# Patient Record
Sex: FEMALE
[De-identification: names, ages, dates, MRNs, and addresses within clinical notes are randomized per-mention and may not be internally consistent; named-entity substitution may affect disease eponyms.]

---

## 2018-08-03 ENCOUNTER — HOSPITAL ENCOUNTER (INPATIENT)
Dept: HOSPITAL 88 - FSED | Age: 63
LOS: 5 days | Discharge: HOME | DRG: 387 | End: 2018-08-08
Attending: INTERNAL MEDICINE | Admitting: INTERNAL MEDICINE
Payer: COMMERCIAL

## 2018-08-03 VITALS — DIASTOLIC BLOOD PRESSURE: 61 MMHG | SYSTOLIC BLOOD PRESSURE: 151 MMHG

## 2018-08-03 VITALS — WEIGHT: 157.56 LBS | BODY MASS INDEX: 27.92 KG/M2 | HEIGHT: 63 IN

## 2018-08-03 VITALS — DIASTOLIC BLOOD PRESSURE: 79 MMHG | SYSTOLIC BLOOD PRESSURE: 162 MMHG

## 2018-08-03 VITALS — SYSTOLIC BLOOD PRESSURE: 151 MMHG | DIASTOLIC BLOOD PRESSURE: 61 MMHG

## 2018-08-03 DIAGNOSIS — E87.6: ICD-10-CM

## 2018-08-03 DIAGNOSIS — K29.40: ICD-10-CM

## 2018-08-03 DIAGNOSIS — R20.0: ICD-10-CM

## 2018-08-03 DIAGNOSIS — R19.7: ICD-10-CM

## 2018-08-03 DIAGNOSIS — R79.89: ICD-10-CM

## 2018-08-03 DIAGNOSIS — R01.1: ICD-10-CM

## 2018-08-03 DIAGNOSIS — F17.210: ICD-10-CM

## 2018-08-03 DIAGNOSIS — K50.911: Primary | ICD-10-CM

## 2018-08-03 DIAGNOSIS — E66.3: ICD-10-CM

## 2018-08-03 DIAGNOSIS — K25.9: ICD-10-CM

## 2018-08-03 DIAGNOSIS — E78.5: ICD-10-CM

## 2018-08-03 DIAGNOSIS — K20.9: ICD-10-CM

## 2018-08-03 DIAGNOSIS — E86.0: ICD-10-CM

## 2018-08-03 LAB
ALBUMIN SERPL-MCNC: 3 G/DL (ref 3.5–5)
ALP SERPL-CCNC: 50 IU/L (ref 40–150)
ALT SERPL-CCNC: 33 IU/L (ref 0–55)
ANION GAP SERPL CALC-SCNC: 14.4 MMOL/L (ref 8–16)
BASOPHILS # BLD AUTO: 0.1 10*3/UL (ref 0–0.1)
BASOPHILS NFR BLD AUTO: 0.5 % (ref 0–1)
BILIRUB CONJ SERPL-MCNC: 0.5 MG/DL (ref 0–0.5)
BUN SERPL-MCNC: 14 MG/DL (ref 7–26)
BUN/CREAT SERPL: 17 (ref 6–25)
CALCIUM SERPL-MCNC: 9.4 MG/DL (ref 8.4–10.2)
CHLORIDE SERPL-SCNC: 98 MMOL/L (ref 98–107)
CK MB SERPL-MCNC: 5 NG/ML (ref 0–5)
CK SERPL-CCNC: 66 IU/L (ref 29–168)
CO2 SERPL-SCNC: 23 MMOL/L (ref 22–29)
DEPRECATED NEUTROPHILS # BLD AUTO: 12.3 10*3/UL (ref 2.1–6.9)
EGFRCR SERPLBLD CKD-EPI 2021: > 60 ML/MIN (ref 60–?)
EOSINOPHIL # BLD AUTO: 0.1 10*3/UL (ref 0–0.4)
EOSINOPHIL NFR BLD AUTO: 0.4 % (ref 0–6)
ERYTHROCYTE [DISTWIDTH] IN CORD BLOOD: 15.6 % (ref 11.7–14.4)
GLUCOSE SERPLBLD-MCNC: 56 MG/DL (ref 74–118)
HCT VFR BLD AUTO: 34.7 % (ref 34.2–44.1)
HGB BLD-MCNC: 12 G/DL (ref 12–16)
LYMPHOCYTES # BLD: 2.4 10*3/UL (ref 1–3.2)
LYMPHOCYTES NFR BLD AUTO: 15.1 % (ref 18–39.1)
MCH RBC QN AUTO: 27.5 PG (ref 28–32)
MCHC RBC AUTO-ENTMCNC: 34.6 G/DL (ref 31–35)
MCV RBC AUTO: 79.4 FL (ref 81–99)
MONOCYTES # BLD AUTO: 1 10*3/UL (ref 0.2–0.8)
MONOCYTES NFR BLD AUTO: 6.5 % (ref 4.4–11.3)
NEUTS SEG NFR BLD AUTO: 77.1 % (ref 38.7–80)
PLATELET # BLD AUTO: 418 X10E3/UL (ref 140–360)
POTASSIUM SERPL-SCNC: 3.4 MMOL/L (ref 3.5–5.1)
RBC # BLD AUTO: 4.37 X10E6/UL (ref 3.6–5.1)
SODIUM SERPL-SCNC: 132 MMOL/L (ref 136–145)

## 2018-08-03 PROCEDURE — 80076 HEPATIC FUNCTION PANEL: CPT

## 2018-08-03 PROCEDURE — 83540 ASSAY OF IRON: CPT

## 2018-08-03 PROCEDURE — 83516 IMMUNOASSAY NONANTIBODY: CPT

## 2018-08-03 PROCEDURE — 74177 CT ABD & PELVIS W/CONTRAST: CPT

## 2018-08-03 PROCEDURE — 82607 VITAMIN B-12: CPT

## 2018-08-03 PROCEDURE — 87177 OVA AND PARASITES SMEARS: CPT

## 2018-08-03 PROCEDURE — 82948 REAGENT STRIP/BLOOD GLUCOSE: CPT

## 2018-08-03 PROCEDURE — 84466 ASSAY OF TRANSFERRIN: CPT

## 2018-08-03 PROCEDURE — 93005 ELECTROCARDIOGRAM TRACING: CPT

## 2018-08-03 PROCEDURE — 71045 X-RAY EXAM CHEST 1 VIEW: CPT

## 2018-08-03 PROCEDURE — 84132 ASSAY OF SERUM POTASSIUM: CPT

## 2018-08-03 PROCEDURE — 88312 SPECIAL STAINS GROUP 1: CPT

## 2018-08-03 PROCEDURE — 83993 ASSAY FOR CALPROTECTIN FECAL: CPT

## 2018-08-03 PROCEDURE — 36415 COLL VENOUS BLD VENIPUNCTURE: CPT

## 2018-08-03 PROCEDURE — 85014 HEMATOCRIT: CPT

## 2018-08-03 PROCEDURE — 85651 RBC SED RATE NONAUTOMATED: CPT

## 2018-08-03 PROCEDURE — 83036 HEMOGLOBIN GLYCOSYLATED A1C: CPT

## 2018-08-03 PROCEDURE — 99284 EMERGENCY DEPT VISIT MOD MDM: CPT

## 2018-08-03 PROCEDURE — 93306 TTE W/DOPPLER COMPLETE: CPT

## 2018-08-03 PROCEDURE — 82550 ASSAY OF CK (CPK): CPT

## 2018-08-03 PROCEDURE — 86671 FUNGUS NES ANTIBODY: CPT

## 2018-08-03 PROCEDURE — 80053 COMPREHEN METABOLIC PANEL: CPT

## 2018-08-03 PROCEDURE — 85018 HEMOGLOBIN: CPT

## 2018-08-03 PROCEDURE — 84443 ASSAY THYROID STIM HORMONE: CPT

## 2018-08-03 PROCEDURE — 71046 X-RAY EXAM CHEST 2 VIEWS: CPT

## 2018-08-03 PROCEDURE — 88305 TISSUE EXAM BY PATHOLOGIST: CPT

## 2018-08-03 PROCEDURE — 87493 C DIFF AMPLIFIED PROBE: CPT

## 2018-08-03 PROCEDURE — 82784 ASSAY IGA/IGD/IGG/IGM EACH: CPT

## 2018-08-03 PROCEDURE — 84484 ASSAY OF TROPONIN QUANT: CPT

## 2018-08-03 PROCEDURE — 80048 BASIC METABOLIC PNL TOTAL CA: CPT

## 2018-08-03 PROCEDURE — 80061 LIPID PANEL: CPT

## 2018-08-03 PROCEDURE — 81001 URINALYSIS AUTO W/SCOPE: CPT

## 2018-08-03 PROCEDURE — 86039 ANTINUCLEAR ANTIBODIES (ANA): CPT

## 2018-08-03 PROCEDURE — 83630 LACTOFERRIN FECAL (QUAL): CPT

## 2018-08-03 PROCEDURE — 85025 COMPLETE CBC W/AUTO DIFF WBC: CPT

## 2018-08-03 PROCEDURE — 82270 OCCULT BLOOD FECES: CPT

## 2018-08-03 PROCEDURE — 86140 C-REACTIVE PROTEIN: CPT

## 2018-08-03 PROCEDURE — 43239 EGD BIOPSY SINGLE/MULTIPLE: CPT

## 2018-08-03 PROCEDURE — 82728 ASSAY OF FERRITIN: CPT

## 2018-08-03 PROCEDURE — 86256 FLUORESCENT ANTIBODY TITER: CPT

## 2018-08-03 PROCEDURE — 86677 HELICOBACTER PYLORI ANTIBODY: CPT

## 2018-08-03 PROCEDURE — 82553 CREATINE MB FRACTION: CPT

## 2018-08-03 PROCEDURE — 87045 FECES CULTURE AEROBIC BACT: CPT

## 2018-08-03 PROCEDURE — 81003 URINALYSIS AUTO W/O SCOPE: CPT

## 2018-08-03 RX ADMIN — Medication SCH MG: at 14:13

## 2018-08-03 RX ADMIN — COLESTIPOL HYDROCHLORIDE SCH G: 1 TABLET ORAL at 20:54

## 2018-08-03 RX ADMIN — SODIUM CHLORIDE SCH MLS/HR: 9 INJECTION, SOLUTION INTRAVENOUS at 20:54

## 2018-08-03 RX ADMIN — ATENOLOL SCH MG: 50 TABLET ORAL at 20:55

## 2018-08-04 VITALS — SYSTOLIC BLOOD PRESSURE: 150 MMHG | DIASTOLIC BLOOD PRESSURE: 67 MMHG

## 2018-08-04 VITALS — SYSTOLIC BLOOD PRESSURE: 139 MMHG | DIASTOLIC BLOOD PRESSURE: 61 MMHG

## 2018-08-04 VITALS — DIASTOLIC BLOOD PRESSURE: 59 MMHG | SYSTOLIC BLOOD PRESSURE: 139 MMHG

## 2018-08-04 VITALS — DIASTOLIC BLOOD PRESSURE: 62 MMHG | SYSTOLIC BLOOD PRESSURE: 147 MMHG

## 2018-08-04 VITALS — SYSTOLIC BLOOD PRESSURE: 154 MMHG | DIASTOLIC BLOOD PRESSURE: 63 MMHG

## 2018-08-04 VITALS — SYSTOLIC BLOOD PRESSURE: 147 MMHG | DIASTOLIC BLOOD PRESSURE: 62 MMHG

## 2018-08-04 LAB
CHOLEST SERPL-MCNC: 129 MD/DL (ref 0–199)
CHOLEST/HDLC SERPL: 3.3 {RATIO} (ref 3–3.6)
CK MB SERPL-MCNC: 3.3 NG/ML (ref 0–5)
CK MB SERPL-MCNC: 3.6 NG/ML (ref 0–5)
CK SERPL-CCNC: 59 IU/L (ref 29–168)
CK SERPL-CCNC: 62 IU/L (ref 29–168)
HDLC SERPL-MSCNC: 39 MG/DL (ref 40–60)
LDLC SERPL CALC-MCNC: 66 MG/DL (ref 60–130)
TRIGL SERPL-MCNC: 118 MG/DL (ref 0–149)
TSH SERPL DL<=0.005 MIU/L-ACNC: 0.85 UIU/ML (ref 0.35–4.94)

## 2018-08-04 RX ADMIN — ATENOLOL SCH MG: 50 TABLET ORAL at 21:12

## 2018-08-04 RX ADMIN — SODIUM CHLORIDE SCH MLS/HR: 9 INJECTION, SOLUTION INTRAVENOUS at 21:20

## 2018-08-04 RX ADMIN — Medication SCH MG: at 21:12

## 2018-08-04 RX ADMIN — LOSARTAN POTASSIUM SCH MG: 100 TABLET, FILM COATED ORAL at 09:30

## 2018-08-04 RX ADMIN — COLESTIPOL HYDROCHLORIDE SCH G: 1 TABLET ORAL at 09:30

## 2018-08-04 RX ADMIN — BICTEGRAVIR SODIUM, EMTRICITABINE, AND TENOFOVIR ALAFENAMIDE FUMARATE SCH: 50; 200; 25 TABLET ORAL at 09:00

## 2018-08-04 RX ADMIN — Medication SCH MG: at 09:30

## 2018-08-04 RX ADMIN — FAMOTIDINE SCH MG: 20 TABLET, FILM COATED ORAL at 17:26

## 2018-08-04 RX ADMIN — Medication SCH MG: at 15:39

## 2018-08-04 RX ADMIN — COLESTIPOL HYDROCHLORIDE SCH G: 1 TABLET ORAL at 21:11

## 2018-08-04 NOTE — HISTORY AND PHYSICAL
PRIMARY CARE PHYSICIAN:  Dr. Sharpe at [facility].



CHIEF COMPLAINT:  Dizziness and racing heart.



HISTORY OF PRESENT ILLNESS:  This is a 63-year-old woman with a history of 

Crohn's disease and chronic diarrhea, now developing dizziness and racing 

heart with left arm tingling, prompting her to come to the hospital. Here 

she was found to have elevated troponin. She is admitted for further 

evaluation and management. 



PAST MEDICAL HISTORY:  Hypertension, diabetes mellitus, hyperlipidemia, 

heart murmur, cigarette use and Crohn's disease.



PAST SURGICAL HISTORY:  None.



ALLERGIES:  PER THE ELECTRONIC MEDICAL RECORDS.



FAMILY HISTORY/SOCIAL HISTORY:  Patient is single. No alcohol or illicits. 

She was smoking 1 pack of cigarettes per day, now down to half a pack per 

day.



MEDICATIONS:  Per the electronic medical records. Medications reviewed.



REVIEW OF SYSTEMS:  Denies any dizziness, fever, chills, sweats, nausea, 

vomiting, diarrhea, leg pain, back pain, headache, blurred vision.



VITAL SIGNS:  Have been reviewed.



PHYSICAL EXAMINATION

GENERAL APPEARANCE:  A tired-appearing woman resting in bed.

HEENT:  Anicteric. Pupils responsive to light. No oral lesions. 

CARDIOVASCULAR:  Normal S1/S2. She has a 3/6 systolic murmur.

LUNGS:  Moderate breath sounds.

ABDOMEN:  Soft, nontender, nondistended. 

EXTREMITIES:  No edema or calf tenderness. 

NEUROLOGICALLY:  Alert and oriented x3. Moving all extremities. 



LABS:  Reviewed.



ASSESSMENT:  She is a 63-year-old woman.

1. Elevated troponin.

1. Arm numbness.

2. Heart murmur.

3. Abnormal electrocardiogram with T-wave flattening and inversion in 

the precordial leads.

4. Dehydration.

5. Diarrhea.

6. Overweight state.

7. Diabetes mellitus type 2.

8. Hypertension.

9. Hyperlipidemia.

10. Crohn's disease.



PLAN

1. Obtain 2-D echocardiogram.

2. Cardiology consultation.

3. Obtain TSH for tachycardia.

4. Obtain hemoglobin A1c and lipid panel.

5. Once cleared by Cardiology, patient can follow up with her GI doctor 

at [facility] for continuing management of her Crohn's disease.

6. Start prophylaxis with Pepcid and Lovenox.



7. Follow up cardiology recommendations.









DD:  08/04/2018 14:41

DT:  08/04/2018 15:40

Job#:  [id number]

## 2018-08-04 NOTE — CONSULTATION
DATE OF CONSULTATION:  2018 





CARDIAC CONSULTATION



REASON FOR THE CONSULTATION:  Chest pressure, chest tightness, left arm 

pain, elevated troponin.



HISTORY:  This is a 63-year-old lady, hypertensive, diabetic, 

hypercholesterolemic, smoker.  Patient for the last 2 to 3 months is having 

diarrhea.  She had colonoscopy and workup and she was diagnosed with 

inflammatory bowel disease, Crohn or may be ulcerative colitis.  She came 

today because she felt her heart racing and with that she had chest 

tightness and left arm pain.  Initially, her troponin was elevated at 0.77, 

second troponin 0.456, third troponin of 0.394.  The patient is admitted 

for further management.  Her EKG showed nonspecific ST changes laterally 

and LVH.  Currently, she is doing well.  Patient does have easy 

fatigability, shortness of breath on exertion.  She attributed all her 

symptoms to her GI problem __________ for the last 2 to 3 months.  She had 

colonoscopy and workup.  There is no orthopnea, no paroxysmal nocturnal 

dyspnea, no syncope, no presyncope, no prior tachycardia or any other 

health problem, no recent trouble, no signs of deep venous thrombosis, and 

no hormone use.



REVIEW OF SYSTEMS:  Extensive to all systems, only pertinent positive and 

negative ones will be mentioned.

CARDIAC:  As per above.

PULMONARY:  No cough, no hemoptysis.

GI:  Chronic diarrhea.  No hematemesis, no melena, but chronic diarrhea.

:  No hematuria, no dysuria.

NEUROMUSCULAR:  No aches, no pains.

GENERAL:  No fever, no chills.

ENDOCRINE:  Patient is diabetic.



SOCIAL HISTORY:  She is single.  Unfortunately she smokes a pack a day.  

She is .



HOME MEDICATIONS:  Includes:

1. Amlodipine 5 mg a day.

2. Lovastatin 20 mg a day.

3. Colestipol 1 g 5 per day, 2 in the morning, 3 in the afternoon.

4. Losartan/hydrochlorothiazide 100 and 25 one tablet a day.

5. Atenolol 25 mg a day.

6. Metformin 1000 mg twice a day.

7. Glimepiride 1 mg a day.

8. Lisinopril 40 mg twice a day.



ALLERGIES:  ATORVASTATIN, PATIENT IS INTOLERANT TO ATORVASTATIN.



PAST MEDICAL HISTORY:

1. Hypertension.

2. Diabetes mellitus.  Of note, her diabetes is at least for 15 years.

3. Hyperlipidemia.

4. Smoker.

5. Diarrhea with diagnosis of inflammatory bowel disease, possible Crohn, 

possible ulcerative colitis.



FAMILY HISTORY:  Father was alcoholic,  of alcohol complication at age 

65.  Mother  in her 70s with kidney disease.  She had diabetes mellitus 

for short period.  Four brothers, all are diabetic, 2 of them already had 

PCI procedure in their 60s.  Five sisters.  No children.



PHYSICAL EXAMINATION:

VITAL SIGNS:  Height of 5 feet 3 inches, weight of 154 pounds, blood 

pressure 150/70, heart rate of 70, respiratory rate of 18.

HEENT:  Pupils are equal and reactive.

NECK:  No elevation of jugular venous pulsation.  No bruit.

CHEST:  Clear to auscultation and percussion.

HEART:  PMI, fifth left intercostal space.  Normal first and second heart 

sounds.

ABDOMEN:  Soft with no organomegaly.  No abdominal bruits.

EXTREMITIES:  No cyanosis.  No clubbing.  No edema. 

NEUROLOGIC:  Nonfocal.



LAB DATA:  Sodium of 132, potassium 3.4, BUN of 14, creatinine of 0.8.  

White blood cell count of 15.9, hemoglobin of 12, hematocrit 35%, platelet 

count of 418,000.  TSH of 0.85.  Triglycerides of 118, cholesterol of 129, 

HDL of 39, LDL of 66.  CK, CK-MB are normal.  Troponin 0.77, 0.456, 0.394.  

EKG as described above.



IMPRESSION:

1. Troponin leak in setting of palpitation, most likely probably 

supraventricular tachycardia secondary to hypokalemia and with that patient 

had troponin leak, which will indicate severe triple-vessel coronary artery 

disease in a patient who is diabetic for many years, heavy smoker for many 

years, and is hypertensive, hyperlipidemic.

2. Severe hypertension.

3. Diabetes mellitus.

4. Hypertension.

5. Hypercholesterolemia.



PLAN:  Patient's stool will be checked for blood.  Patient will be loaded 

with Plavix.  Patient already most likely failed her stress test because of 

the leak of troponin in the setting of short-lived probably arrhythmia with 

symptomatic pain in the arm and in the chest.  Patient options are 

discussed.  Patient to make a decision tomorrow.  Definitely, will load her 

with Plavix.  Will continue antiplatelet agent.  Will continue diabetes 

medication.  Will continue beta blocker.  Will continue ARB.  Patient on 

appropriate medical therapy, planned to be __________ address her problem.  

Questions are answered.





DD:  2018 16:05

DT:  2018 20:01

Job#:  [id number]

## 2018-08-05 VITALS — SYSTOLIC BLOOD PRESSURE: 120 MMHG | DIASTOLIC BLOOD PRESSURE: 52 MMHG

## 2018-08-05 VITALS — SYSTOLIC BLOOD PRESSURE: 111 MMHG | DIASTOLIC BLOOD PRESSURE: 52 MMHG

## 2018-08-05 VITALS — DIASTOLIC BLOOD PRESSURE: 61 MMHG | SYSTOLIC BLOOD PRESSURE: 140 MMHG

## 2018-08-05 VITALS — DIASTOLIC BLOOD PRESSURE: 47 MMHG | SYSTOLIC BLOOD PRESSURE: 115 MMHG

## 2018-08-05 VITALS — DIASTOLIC BLOOD PRESSURE: 65 MMHG | SYSTOLIC BLOOD PRESSURE: 127 MMHG

## 2018-08-05 VITALS — SYSTOLIC BLOOD PRESSURE: 125 MMHG | DIASTOLIC BLOOD PRESSURE: 48 MMHG

## 2018-08-05 VITALS — SYSTOLIC BLOOD PRESSURE: 127 MMHG | DIASTOLIC BLOOD PRESSURE: 65 MMHG

## 2018-08-05 LAB
ALBUMIN SERPL-MCNC: 2.4 G/DL (ref 3.5–5)
ALBUMIN/GLOB SERPL: 0.7 {RATIO} (ref 0.8–2)
ALP SERPL-CCNC: 46 IU/L (ref 40–150)
ALT SERPL-CCNC: 22 IU/L (ref 0–55)
ANION GAP SERPL CALC-SCNC: 13 MMOL/L (ref 8–16)
BASOPHILS # BLD AUTO: 0.1 10*3/UL (ref 0–0.1)
BASOPHILS NFR BLD AUTO: 0.4 % (ref 0–1)
BUN SERPL-MCNC: 7 MG/DL (ref 7–26)
BUN/CREAT SERPL: 10 (ref 6–25)
CALCIUM SERPL-MCNC: 8.5 MG/DL (ref 8.4–10.2)
CHLORIDE SERPL-SCNC: 99 MMOL/L (ref 98–107)
CO2 SERPL-SCNC: 24 MMOL/L (ref 22–29)
DEPRECATED NEUTROPHILS # BLD AUTO: 11.9 10*3/UL (ref 2.1–6.9)
EGFRCR SERPLBLD CKD-EPI 2021: > 60 ML/MIN (ref 60–?)
EOSINOPHIL # BLD AUTO: 0.2 10*3/UL (ref 0–0.4)
EOSINOPHIL NFR BLD AUTO: 1.5 % (ref 0–6)
ERYTHROCYTE [DISTWIDTH] IN CORD BLOOD: 15.8 % (ref 11.7–14.4)
FERRITIN SERPL-MCNC: 513.46 NG/ML (ref 4.63–204)
GLOBULIN PLAS-MCNC: 3.5 G/DL (ref 2.3–3.5)
GLUCOSE SERPLBLD-MCNC: 87 MG/DL (ref 74–118)
HCT VFR BLD AUTO: 32.3 % (ref 34.2–44.1)
HGB BLD-MCNC: 10.9 G/DL (ref 12–16)
IRON SATN MFR SERPL: 8 % (ref 15–50)
IRON SERPL-MCNC: 15 UG/DL (ref 50–170)
LYMPHOCYTES # BLD: 2.5 10*3/UL (ref 1–3.2)
LYMPHOCYTES NFR BLD AUTO: 15.8 % (ref 18–39.1)
MCH RBC QN AUTO: 27.2 PG (ref 28–32)
MCHC RBC AUTO-ENTMCNC: 33.7 G/DL (ref 31–35)
MCV RBC AUTO: 80.5 FL (ref 81–99)
MONOCYTES # BLD AUTO: 1.2 10*3/UL (ref 0.2–0.8)
MONOCYTES NFR BLD AUTO: 7.7 % (ref 4.4–11.3)
NEUTS SEG NFR BLD AUTO: 74.1 % (ref 38.7–80)
PLATELET # BLD AUTO: 380 X10E3/UL (ref 140–360)
POTASSIUM SERPL-SCNC: 3 MMOL/L (ref 3.5–5.1)
RBC # BLD AUTO: 4.01 X10E6/UL (ref 3.6–5.1)
SODIUM SERPL-SCNC: 133 MMOL/L (ref 136–145)
TIBC SERPL-MCNC: 190 UG/DL (ref 261–478)
TRANSFERRIN SERPL-MCNC: 136 MG/DL (ref 180–382)

## 2018-08-05 RX ADMIN — ATENOLOL SCH MG: 50 TABLET ORAL at 21:25

## 2018-08-05 RX ADMIN — BICTEGRAVIR SODIUM, EMTRICITABINE, AND TENOFOVIR ALAFENAMIDE FUMARATE SCH: 50; 200; 25 TABLET ORAL at 09:00

## 2018-08-05 RX ADMIN — COLESTIPOL HYDROCHLORIDE SCH G: 1 TABLET ORAL at 22:28

## 2018-08-05 RX ADMIN — FAMOTIDINE SCH MG: 20 TABLET, FILM COATED ORAL at 09:50

## 2018-08-05 RX ADMIN — COLESTIPOL HYDROCHLORIDE SCH G: 1 TABLET ORAL at 09:50

## 2018-08-05 RX ADMIN — FAMOTIDINE SCH MG: 20 TABLET, FILM COATED ORAL at 17:28

## 2018-08-05 RX ADMIN — SODIUM CHLORIDE SCH MLS/HR: 9 INJECTION, SOLUTION INTRAVENOUS at 05:50

## 2018-08-05 RX ADMIN — Medication SCH MG: at 09:50

## 2018-08-05 RX ADMIN — LOSARTAN POTASSIUM SCH MG: 100 TABLET, FILM COATED ORAL at 09:50

## 2018-08-05 NOTE — PROGRESS NOTE
DATE:  August 05, 2018, Sunday 



MEDICINE PROGRESS NOTE



TIME OF SERVICE:  11:15 a.m.



OVERNIGHT:  No acute events.



REVIEW OF SYSTEMS:  Patient denies chest pain or shortness of breath. 

Patient reports intermittent nausea, diarrhea, abdominal pain and early 

satiety. Patient denies dizziness, fever, chills, sweats, leg pain, back 

pain, headache or blurry vision.



OBJECTIVE 

VITAL SIGNS:  T 97.5, P 66, respirations 18, BP is 111/52. SpO2 100% on 

room air.

GENERAL APPEARANCE:  This is a very tired-appearing middle-aged woman 

resting supine in bed. 

HEENT:  Normocephalic, no sinus tenderness, PERRLA, oral mucosa dry and 

intact. Trachea midline without JVD. 

CARDIOVASCULAR:  S1, S2 auscultated with a pansystolic 3/6 murmur. 

LUNGS:  Moderate breath sounds in all fields.

ABDOMEN:  Soft, slightly tender and not distended.

EXTREMITIES:  No edema or calf tenderness.

NEUROLOGIC:  No gross defects noted on examination. Moves all extremities.

PSYCH:   Flat affect.



LABS:  WBC is 16.06, H\T\H 10.9 and 32.3 respectively, platelets 180. C. diff 

noted negative. Stool occult blood positive. Sodium 133, potassium 3, 

chloride 99, CO2 ___99, gap 13, BUN 7, creatinine 0.7. 



MEDICATIONS

1. Procardia XL 60 mg p.o. q.12.

2. Pepcid 20 mg b.i.d. a.c. meals.

3. Hydrochlorothiazide 25 mg p.o. daily.

4. Losartan 100 mg p.o. daily.

5. Colestid 2 g a.c. breakfast daily.

6. Aspirin 325 q.a.m.

7. Atenolol 50 mg p.o. nightly.

8. Colestid 3 g nightly.

9. Lovenox 40 mg daily at 1700.

10. Normal saline at 60 mL an hour.

11. Home medications.

12. Zofran 4 mg p.r.n. q.4 h. nausea and vomiting.



ASSESSMENT AND PLAN:  This is a 63-year-old woman with 

1. Elevated troponin/abnormal electrocardiogram with T-wave flattening 

and inversion in the precordial leads. A 2-D echo obtained. Cardiology 

is consulting, and patient verbalizes desire for left heart cath.

2. Dehydration. Continue IV fluids.

3. Diarrhea. Continue IV fluids and monitor electrolytes. Replace 

potassium this day.

4. Overweight state. Outpatient counseling.

5. Diabetes mellitus type 2. Hemoglobin A1c and lipid panel. Found the 

patient with a 5.6 A1c, 118 triglycerides and 129 cholesterol.

6. Hypertension. Controlled on current regimen. Continue to monitor.

7. Hyperlipidemia. Continue medications. 

8. Crohn's disease. Patient reported colonoscopy approximately 6 weeks 

ago with negative findings. Possible followup with outpatient GI doctor 

at [facility] for management. However, patient with positive stool 

occult blood this day. Will obtain iron studies to determine best course 

of treatment prior to further GI evaluation today.

9. Prophylaxis. Pepcid and Lovenox.



10. Follow up a hemogram, iron studies, and cardiology interventions. 

Discussed with RN and patient, all questions answered.



Dictated by:  Jimbo Brown NP 









DD:  08/05/2018 11:49

DT:  08/05/2018 12:30

Job#:  [id number] [email or web address]

## 2018-08-06 VITALS — SYSTOLIC BLOOD PRESSURE: 163 MMHG | DIASTOLIC BLOOD PRESSURE: 54 MMHG

## 2018-08-06 VITALS — SYSTOLIC BLOOD PRESSURE: 106 MMHG | DIASTOLIC BLOOD PRESSURE: 50 MMHG

## 2018-08-06 VITALS — SYSTOLIC BLOOD PRESSURE: 141 MMHG | DIASTOLIC BLOOD PRESSURE: 62 MMHG

## 2018-08-06 VITALS — SYSTOLIC BLOOD PRESSURE: 130 MMHG | DIASTOLIC BLOOD PRESSURE: 60 MMHG

## 2018-08-06 VITALS — DIASTOLIC BLOOD PRESSURE: 50 MMHG | SYSTOLIC BLOOD PRESSURE: 106 MMHG

## 2018-08-06 VITALS — DIASTOLIC BLOOD PRESSURE: 61 MMHG | SYSTOLIC BLOOD PRESSURE: 112 MMHG

## 2018-08-06 VITALS — DIASTOLIC BLOOD PRESSURE: 69 MMHG | SYSTOLIC BLOOD PRESSURE: 152 MMHG

## 2018-08-06 LAB
ALBUMIN SERPL-MCNC: 2.5 G/DL (ref 3.5–5)
ALBUMIN/GLOB SERPL: 0.7 {RATIO} (ref 0.8–2)
ALP SERPL-CCNC: 52 IU/L (ref 40–150)
ALT SERPL-CCNC: 22 IU/L (ref 0–55)
ANION GAP SERPL CALC-SCNC: 13.8 MMOL/L (ref 8–16)
BACTERIA URNS QL MICRO: (no result) /HPF
BASOPHILS # BLD AUTO: 0.1 10*3/UL (ref 0–0.1)
BASOPHILS NFR BLD AUTO: 0.5 % (ref 0–1)
BILIRUB UR QL: NEGATIVE
BUN SERPL-MCNC: 6 MG/DL (ref 7–26)
BUN/CREAT SERPL: 10 (ref 6–25)
C DIFFICILE TOXIN A&B AMP PROB: NEGATIVE
CALCIUM SERPL-MCNC: 8.4 MG/DL (ref 8.4–10.2)
CHLORIDE SERPL-SCNC: 101 MMOL/L (ref 98–107)
CLARITY UR: CLEAR
CO2 SERPL-SCNC: 21 MMOL/L (ref 22–29)
COLOR UR: YELLOW
DEPRECATED NEUTROPHILS # BLD AUTO: 11.8 10*3/UL (ref 2.1–6.9)
DEPRECATED RBC URNS MANUAL-ACNC: >50 /HPF (ref 0–5)
EGFRCR SERPLBLD CKD-EPI 2021: > 60 ML/MIN (ref 60–?)
EOSINOPHIL # BLD AUTO: 0.3 10*3/UL (ref 0–0.4)
EOSINOPHIL NFR BLD AUTO: 1.8 % (ref 0–6)
EPI CELLS URNS QL MICRO: (no result) /LPF
ERYTHROCYTE [DISTWIDTH] IN CORD BLOOD: 15.9 % (ref 11.7–14.4)
GLOBULIN PLAS-MCNC: 3.4 G/DL (ref 2.3–3.5)
GLUCOSE SERPLBLD-MCNC: 101 MG/DL (ref 74–118)
HCT VFR BLD AUTO: 31.3 % (ref 34.2–44.1)
HGB BLD-MCNC: 10.7 G/DL (ref 12–16)
KETONES UR QL STRIP.AUTO: (no result)
LACTOFERRIN STL QL: POSITIVE
LEUKOCYTE ESTERASE UR QL STRIP.AUTO: NEGATIVE
LYMPHOCYTES # BLD: 1.8 10*3/UL (ref 1–3.2)
LYMPHOCYTES NFR BLD AUTO: 11.5 % (ref 18–39.1)
MCH RBC QN AUTO: 27.2 PG (ref 28–32)
MCHC RBC AUTO-ENTMCNC: 34.2 G/DL (ref 31–35)
MCV RBC AUTO: 79.6 FL (ref 81–99)
MONOCYTES # BLD AUTO: 1.2 10*3/UL (ref 0.2–0.8)
MONOCYTES NFR BLD AUTO: 7.6 % (ref 4.4–11.3)
NEUTS SEG NFR BLD AUTO: 78.1 % (ref 38.7–80)
NITRITE UR QL STRIP.AUTO: NEGATIVE
NON-SQ EPI CELLS URNS QL MICRO: (no result)
PLATELET # BLD AUTO: 386 X10E3/UL (ref 140–360)
POTASSIUM SERPL-SCNC: 2.8 MMOL/L (ref 3.5–5.1)
PROT UR QL STRIP.AUTO: (no result)
RBC # BLD AUTO: 3.93 X10E6/UL (ref 3.6–5.1)
SODIUM SERPL-SCNC: 133 MMOL/L (ref 136–145)
SP GR UR STRIP: 1.01 (ref 1.01–1.02)
UROBILINOGEN UR STRIP-MCNC: 0.2 MG/DL (ref 0.2–1)
WBC #/AREA URNS HPF: (no result) /HPF (ref 0–5)

## 2018-08-06 RX ADMIN — DICYCLOMINE HYDROCHLORIDE SCH MG: 20 TABLET ORAL at 05:08

## 2018-08-06 RX ADMIN — COLESTIPOL HYDROCHLORIDE SCH G: 1 TABLET ORAL at 21:00

## 2018-08-06 RX ADMIN — ATENOLOL SCH MG: 50 TABLET ORAL at 21:21

## 2018-08-06 RX ADMIN — Medication SCH MG: at 08:25

## 2018-08-06 RX ADMIN — SODIUM CHLORIDE SCH MLS/HR: 9 INJECTION, SOLUTION INTRAVENOUS at 15:40

## 2018-08-06 RX ADMIN — LOSARTAN POTASSIUM SCH MG: 100 TABLET, FILM COATED ORAL at 08:25

## 2018-08-06 RX ADMIN — Medication SCH MG: at 08:26

## 2018-08-06 RX ADMIN — DICYCLOMINE HYDROCHLORIDE SCH MG: 20 TABLET ORAL at 15:40

## 2018-08-06 RX ADMIN — DICYCLOMINE HYDROCHLORIDE SCH MG: 20 TABLET ORAL at 21:21

## 2018-08-06 RX ADMIN — BICTEGRAVIR SODIUM, EMTRICITABINE, AND TENOFOVIR ALAFENAMIDE FUMARATE SCH: 50; 200; 25 TABLET ORAL at 08:26

## 2018-08-06 RX ADMIN — AMLODIPINE BESYLATE SCH MG: 5 TABLET ORAL at 08:26

## 2018-08-06 RX ADMIN — FAMOTIDINE SCH MG: 20 TABLET, FILM COATED ORAL at 07:30

## 2018-08-06 RX ADMIN — FAMOTIDINE SCH MG: 20 TABLET, FILM COATED ORAL at 15:40

## 2018-08-06 RX ADMIN — SODIUM CHLORIDE SCH MLS/HR: 9 INJECTION, SOLUTION INTRAVENOUS at 02:49

## 2018-08-06 RX ADMIN — COLESTIPOL HYDROCHLORIDE SCH G: 1 TABLET ORAL at 07:30

## 2018-08-06 NOTE — PROGRESS NOTE
DATE:  August 06, 2018 



TIME:  1315.



OVERNIGHT:  No acute events.  Patient with abrupt onset of hematuria this 

a.m.



REVIEW OF SYSTEMS:  The patient denies chest pain or shortness of breath.  

Continues with report of intermittent nausea and diarrhea with abdominal 

pain and early satiety.  Patient denies dizziness, fever, chills, sweats, 

leg pain, back pain, otherwise headache or blurry vision.  



VITAL SIGNS:  T 95.9, P 79, respiratory rate 18, /50 this a.m. prior 

to medications.  SpO2 99% on room air.  



PHYSICAL EXAMINATION 

GENERAL APPEARANCE:  This very tired appearing elderly female, lying supine 

in bed.  

HEENT:  Normocephalic without sinus tenderness.  PERRLA with moist and 

intact oral mucosa. Trachea midline without JVD.  

CV:  S1, S2 auscultated with pansystolic 3/6 murmur.  

LUNGS:  Moderate breath sounds in all fields.  

ABDOMEN:  Soft, slightly tender and not distended.  

EXTREMITIES:  No edema or calf tenderness.  

NEUROLOGIC:  No gross defects noted on examination.  Moves all extremities. 

 

PSYCH:  Flat affect.  



LABS:  Na 133, K 2.8, Cl 101, CO2 21, gap 13, BUN 6, creatinine 0.61.  WBC 

this a.m. 15.17.  H and H stable at 10.7 and 31.3, platelet count remains 

elevated at 386.  



MEDICATIONS

1. KCl replacement this day. 

2. NS at 60 mL per hour IV. 

3. Colestid 3 grams at bedtime. 

4. Atenolol 50 mg p.o. at bedtime.

5. Bentyl 20 mg p.o. q.8 hours.

6. Norvasc 5 mg p.o. daily.

7. Pepcid 20 mg b.i.d. a.c.

8. Hydrochlorothiazide 25 mg p.o. daily.

9. Losartan 100 mg p.o. daily. 

10. Unidentified home medication once daily.

11. Colestid 2 grams a.c. breakfast. 

12. Zofran p.r.n. 



ASSESSMENT AND PLAN:  This is a 63-year-old woman with 

1. Elevated troponin/abnormal electrocardiogram with T-wave flattening 

and inversion in the precordial leads.  Two-dimensional echocardiogram 

obtained shortly after admission.  Cardiology and diagnostic 

intervention this a.m. deferred due to abrupt onset of hematuria.  

2. Dehydration.  Continue intravenous fluids.  

3. Diarrhea.  Continue intravenous fluids.  Monitor electrolytes.  

Potassium replaced this day.  Follow up in the a.m.

4. Overweight.  Stay in outpatient counseling.

5. Diabetes mellitus type 2.  Hemoglobin and lipid panel reviewed 

previously. 

6. Hypertension, controlled on current regimen.  

7. Hyperlipidemia.  Continue medications.

8. Crohn disease.  Patient reported colonoscopy approximately 6 weeks 

ago with negative findings and possible followup with outpatient 

gastroenterology doctor at [facility] for management; however, 

patient with positive stool occult blood this day.  Patient found with 

low iron, total iron-binding capacity, and elevated ferritin.  

Gastroenterology consult initiated overnight.  Recommendations pending.  

Prophylaxis Pepcid and Lovenox.  





DISPOSITION:  Left heart cath indefinitely deferred per cardiovascular 

services concerning abrupt onset of hematuria this day.  Will follow up 

values in the morning and anticipate discharge with GI recommendations.  



Dictated by Jimbo Brown NP









DD:  08/06/2018 13:20

DT:  08/06/2018 13:26

Job#:  [id number] [id number]

## 2018-08-06 NOTE — DIAGNOSTIC IMAGING REPORT
CHEST SINGLE (PORTABLE),    



Technique: CHEST SINGLE (PORTABLE)

Comparison: None

Clinical history:  Leukocytosis 



DISCUSSION:

Unremarkable portable appearance of the heart, mediastinum, lungs and pleural

spaces.



IMPRESSION:

No acute abnormality



Signed by: Dr Tamika Ragland MD on 8/6/2018 6:36 AM

## 2018-08-07 VITALS — DIASTOLIC BLOOD PRESSURE: 60 MMHG | SYSTOLIC BLOOD PRESSURE: 133 MMHG

## 2018-08-07 VITALS — SYSTOLIC BLOOD PRESSURE: 135 MMHG | DIASTOLIC BLOOD PRESSURE: 60 MMHG

## 2018-08-07 VITALS — SYSTOLIC BLOOD PRESSURE: 130 MMHG | DIASTOLIC BLOOD PRESSURE: 58 MMHG

## 2018-08-07 VITALS — SYSTOLIC BLOOD PRESSURE: 135 MMHG | DIASTOLIC BLOOD PRESSURE: 65 MMHG

## 2018-08-07 VITALS — SYSTOLIC BLOOD PRESSURE: 133 MMHG | DIASTOLIC BLOOD PRESSURE: 60 MMHG

## 2018-08-07 VITALS — DIASTOLIC BLOOD PRESSURE: 76 MMHG | SYSTOLIC BLOOD PRESSURE: 173 MMHG

## 2018-08-07 LAB
ALBUMIN SERPL-MCNC: 2.5 G/DL (ref 3.5–5)
ALBUMIN/GLOB SERPL: 0.8 {RATIO} (ref 0.8–2)
ALP SERPL-CCNC: 56 IU/L (ref 40–150)
ALT SERPL-CCNC: 21 IU/L (ref 0–55)
ANION GAP SERPL CALC-SCNC: 13.3 MMOL/L (ref 8–16)
BASOPHILS # BLD AUTO: 0.1 10*3/UL (ref 0–0.1)
BASOPHILS NFR BLD AUTO: 0.5 % (ref 0–1)
BUN SERPL-MCNC: 6 MG/DL (ref 7–26)
BUN/CREAT SERPL: 9 (ref 6–25)
CALCIUM SERPL-MCNC: 8.5 MG/DL (ref 8.4–10.2)
CHLORIDE SERPL-SCNC: 102 MMOL/L (ref 98–107)
CO2 SERPL-SCNC: 22 MMOL/L (ref 22–29)
DEPRECATED NEUTROPHILS # BLD AUTO: 9.2 10*3/UL (ref 2.1–6.9)
EGFRCR SERPLBLD CKD-EPI 2021: > 60 ML/MIN (ref 60–?)
EOSINOPHIL # BLD AUTO: 0.2 10*3/UL (ref 0–0.4)
EOSINOPHIL NFR BLD AUTO: 1.7 % (ref 0–6)
ERYTHROCYTE [DISTWIDTH] IN CORD BLOOD: 16.2 % (ref 11.7–14.4)
GLOBULIN PLAS-MCNC: 3.3 G/DL (ref 2.3–3.5)
GLUCOSE SERPLBLD-MCNC: 92 MG/DL (ref 74–118)
HCT VFR BLD AUTO: 29.8 % (ref 34.2–44.1)
HGB BLD-MCNC: 10.2 G/DL (ref 12–16)
LYMPHOCYTES # BLD: 2.4 10*3/UL (ref 1–3.2)
LYMPHOCYTES NFR BLD AUTO: 18.3 % (ref 18–39.1)
MCH RBC QN AUTO: 27.3 PG (ref 28–32)
MCHC RBC AUTO-ENTMCNC: 34.2 G/DL (ref 31–35)
MCV RBC AUTO: 79.7 FL (ref 81–99)
MONOCYTES # BLD AUTO: 1.3 10*3/UL (ref 0.2–0.8)
MONOCYTES NFR BLD AUTO: 9.8 % (ref 4.4–11.3)
NEUTS SEG NFR BLD AUTO: 69.1 % (ref 38.7–80)
PLATELET # BLD AUTO: 376 X10E3/UL (ref 140–360)
POTASSIUM SERPL-SCNC: 3.3 MMOL/L (ref 3.5–5.1)
RBC # BLD AUTO: 3.74 X10E6/UL (ref 3.6–5.1)
SODIUM SERPL-SCNC: 134 MMOL/L (ref 136–145)

## 2018-08-07 PROCEDURE — 0DD68ZX EXTRACTION OF STOMACH, VIA NATURAL OR ARTIFICIAL OPENING ENDOSCOPIC, DIAGNOSTIC: ICD-10-PCS | Performed by: INTERNAL MEDICINE

## 2018-08-07 PROCEDURE — 0DD78ZX EXTRACTION OF STOMACH, PYLORUS, VIA NATURAL OR ARTIFICIAL OPENING ENDOSCOPIC, DIAGNOSTIC: ICD-10-PCS | Performed by: INTERNAL MEDICINE

## 2018-08-07 PROCEDURE — 0DD98ZX EXTRACTION OF DUODENUM, VIA NATURAL OR ARTIFICIAL OPENING ENDOSCOPIC, DIAGNOSTIC: ICD-10-PCS | Performed by: INTERNAL MEDICINE

## 2018-08-07 RX ADMIN — FAMOTIDINE SCH MG: 20 TABLET, FILM COATED ORAL at 07:30

## 2018-08-07 RX ADMIN — COLESTIPOL HYDROCHLORIDE SCH G: 1 TABLET ORAL at 07:30

## 2018-08-07 RX ADMIN — BICTEGRAVIR SODIUM, EMTRICITABINE, AND TENOFOVIR ALAFENAMIDE FUMARATE SCH: 50; 200; 25 TABLET ORAL at 07:43

## 2018-08-07 RX ADMIN — DICYCLOMINE HYDROCHLORIDE SCH MG: 20 TABLET ORAL at 22:21

## 2018-08-07 RX ADMIN — SODIUM CHLORIDE SCH MLS/HR: 9 INJECTION, SOLUTION INTRAVENOUS at 07:43

## 2018-08-07 RX ADMIN — DICYCLOMINE HYDROCHLORIDE SCH MG: 20 TABLET ORAL at 14:00

## 2018-08-07 RX ADMIN — METHYLPREDNISOLONE SODIUM SUCCINATE SCH MG: 40 INJECTION, POWDER, LYOPHILIZED, FOR SOLUTION INTRAMUSCULAR; INTRAVENOUS at 22:20

## 2018-08-07 RX ADMIN — AMLODIPINE BESYLATE SCH MG: 5 TABLET ORAL at 07:43

## 2018-08-07 RX ADMIN — ATENOLOL SCH MG: 50 TABLET ORAL at 22:21

## 2018-08-07 RX ADMIN — SUCRALFATE SCH GM: 1 TABLET ORAL at 16:48

## 2018-08-07 RX ADMIN — COLESTIPOL HYDROCHLORIDE SCH G: 1 TABLET ORAL at 22:22

## 2018-08-07 RX ADMIN — LOSARTAN POTASSIUM SCH MG: 100 TABLET, FILM COATED ORAL at 07:43

## 2018-08-07 RX ADMIN — FAMOTIDINE SCH MG: 20 TABLET, FILM COATED ORAL at 16:48

## 2018-08-07 RX ADMIN — DICYCLOMINE HYDROCHLORIDE SCH MG: 20 TABLET ORAL at 05:32

## 2018-08-07 RX ADMIN — SUCRALFATE SCH GM: 1 TABLET ORAL at 22:20

## 2018-08-07 RX ADMIN — Medication SCH MG: at 07:43

## 2018-08-07 RX ADMIN — METHYLPREDNISOLONE SODIUM SUCCINATE SCH MG: 40 INJECTION, POWDER, LYOPHILIZED, FOR SOLUTION INTRAMUSCULAR; INTRAVENOUS at 08:21

## 2018-08-07 NOTE — OPERATIVE REPORT
DATE OF PROCEDURE:  August 07, 2018 



REFERRING PHYSICIAN:   Dr. Katie Liang



PROCEDURE PERFORMED:  Esophagogastroduodenoscopy with biopsies.  



INDICATIONS FOR EGD:  Upper abdominal pain, nausea.



MEDICATION:  Patient was done under MAC.  Please see anesthesiologist's 

note.



PROCEDURE:  With the patient in the left lateral decubitus position, the 

flexible fiberoptic Olympus gastroscope was introduced into the esophagus 

under direct visualization without any difficulty.  There was some patchy 

erythema noted in the distal esophagus.  The scope was then advanced with 

ease into the stomach, and the mucosa overlying the body revealed some 

diffuse friability and some patchy nodularity.  Multiple biopsies were 

obtained.  There was a focal raised area in the proximal antrum along the 

greater curvature and that was biopsied.  Also the antral mucosa was 

atrophic, and biopsies were obtained.  Several large ulcers were noted up 

to 2 cm in size, antrum, and biopsies were obtained.  The pylorus was of 

normal contour and shape.  It was intubated with ease, and the scope was 

advanced all the way to the 2nd portion of the duodenum.  The scope was 

then withdrawn slowly, and there were some scalloped folds noted in the 

proximal duodenum, and biopsies were obtained to rule out sprue.  The 

mucosa overlying the duodenal bulb grossly appeared to be within normal 

limits.  The scope was then withdrawn back into the stomach and 

retroflexed, and the cardia appeared to be within normal limits.  Similar  

inflammatory findings were noted in the fundus.  The scope was then 

straightened out.  The stomach was decompressed.  The scope was 

subsequently withdrawn.  Patient tolerated the procedure well.



IMPRESSION

1. Distal esophagitis.

2. Gastritis, body, biopsied.

3. Focal nodularity, proximal antrum greater curvature, biopsied.

4. Rule out atrophic gastritis, antrum.

5. Gastric ulcers up to 2 cm in size, biopsies obtained.  

6. Rule out sprue.



PLAN:  Follow up histology.  Continue current therapy.  Add Carafate 1 gram 

p.o. a.c. t.i.d. and nightly.  Patient will need a followup EGD if all 

biopsies are benign in 2 months to re-evaluate the stomach and obtain 

additional biopsies if necessary.  



  





DD:  08/07/2018 14:01

DT:  08/07/2018 14:18

Job#:  [id number] 



cc:KATIE LIANG MD

## 2018-08-07 NOTE — PROGRESS NOTE
DATE:  August 07, 2018 



TIME:  7:14 a.m. 



OVERNIGHT:  Had rectal bleeding. 



REVIEW OF SYSTEMS:  Denies any dizziness, chest pain, shortness of breath, 

fever, chills, sweats, nausea or vomiting.  Now leg pain.



VITAL SIGNS:  Reviewed.



PHYSICAL EXAMINATION 

GENERAL:  A tired-appearing woman resting in bed.

HEENT:  Anicteric.

CARDIOVASCULAR:  Normal S1 and S2.  

LUNGS:  Moderate breath sounds. 

ABDOMEN:  Soft, nontender and nondistended. 

EXTREMITIES:  No edema. 

SKIN:  Dry.

PSYCHIATRIC:  Normal affect.



LABS:  Reviewed.



MEDICATIONS:  Reviewed.



ASSESSMENT AND PLAN:  A 63-year-old woman.

1. Elevated troponin.

2. Arm numbness. 

3. Heart murmur.

4. Abnormal electrocardiogram with T-wave flattening and inversion in 

the precordial leads.

5. Dehydration.

6. Bloody diarrhea.  

7. Overweight state.

8. Diabetes mellitus, type 2.

9. Hypertension.

10. Hyperlipidemia.  

11. Acute Crohn's exacerbation.



PLAN

1. Heart catheterization has been canceled.  Will continue medical 

management. 

2. Followup endoscopy today for rectal bleeding.  

3. Start the patient on steroid therapy.  

4. Hemoglobin A1c was 5.6, LDL 66, and triglycerides 180.

5. Replace potassium this morning.

6. Leukocytosis, mild.  Will follow.  May increase after being started 

on steroids.

7. C. difficile testing is negative.

8. Follow up S. cerevisiae testing.

9. Follow up immunology testing.

10. Follow up cultures.

11. Patient remains on beta blocker and calcium channel blocker, but 

aspirin has been discontinued.







DD:  08/07/2018 07:16

DT:  08/07/2018 07:23

Job#:  [id number]

## 2018-08-08 VITALS — DIASTOLIC BLOOD PRESSURE: 62 MMHG | SYSTOLIC BLOOD PRESSURE: 140 MMHG

## 2018-08-08 VITALS — DIASTOLIC BLOOD PRESSURE: 69 MMHG | SYSTOLIC BLOOD PRESSURE: 156 MMHG

## 2018-08-08 VITALS — DIASTOLIC BLOOD PRESSURE: 65 MMHG | SYSTOLIC BLOOD PRESSURE: 146 MMHG

## 2018-08-08 VITALS — SYSTOLIC BLOOD PRESSURE: 146 MMHG | DIASTOLIC BLOOD PRESSURE: 65 MMHG

## 2018-08-08 LAB
HCT VFR BLD AUTO: 31.6 % (ref 34.2–44.1)
HGB BLD-MCNC: 10.7 G/DL (ref 12–16)

## 2018-08-08 RX ADMIN — SODIUM CHLORIDE SCH MLS/HR: 9 INJECTION, SOLUTION INTRAVENOUS at 00:30

## 2018-08-08 RX ADMIN — Medication SCH MG: at 09:55

## 2018-08-08 RX ADMIN — SUCRALFATE SCH GM: 1 TABLET ORAL at 09:55

## 2018-08-08 RX ADMIN — FAMOTIDINE SCH MG: 20 TABLET, FILM COATED ORAL at 09:55

## 2018-08-08 RX ADMIN — DICYCLOMINE HYDROCHLORIDE SCH MG: 20 TABLET ORAL at 05:29

## 2018-08-08 RX ADMIN — COLESTIPOL HYDROCHLORIDE SCH G: 1 TABLET ORAL at 09:55

## 2018-08-08 RX ADMIN — METHYLPREDNISOLONE SODIUM SUCCINATE SCH MG: 40 INJECTION, POWDER, LYOPHILIZED, FOR SOLUTION INTRAMUSCULAR; INTRAVENOUS at 09:55

## 2018-08-08 NOTE — DISCHARGE SUMMARY
PRINCIPAL DIAGNOSES 

1.  Distal esophagitis.

2.  Gastritis.

3.  Focal nodular proximal antrum of the greater curvature.

4.  Possible atrophic gastritis of the antrum.

5.  Gastric ulcers up to 2 cm in size.

6.  Elevated troponins.

7.  Heart murmur.

8.  Abnormal electrocardiogram with T-wave flattening and inversion in the 

precordial leads.

9.  Bloody diarrhea with acute Crohn exacerbation.

10. Diabetes mellitus, type 2.  Hemoglobin A1c 5.6, LDL 66 and 

triglycerides 180.

11. Overweight state.

12. Dehydration.



SECONDARY DIAGNOSIS:  Crohn disease.



CHIEF COMPLAINT:  Dizziness and racing heart.



HISTORY OF PRESENT ILLNESS:  This is a 63-year-old woman with dizziness and 

racing heart.  Refer to the H and P for further details.



HOSPITAL COURSE:  The patient was found to have elevated troponin.  She had 

arm numbness.  Heart catheterization was scheduled, but the patient had 

rectal bleeding.  Therefore, this was held.  The patient was on medical 

management.  Aspirin and Plavix have not been utilized due to GI bleeding.  

The patient underwent endoscopy.  EGD showed gastric ulcers and other 

findings as noted above.  The patient on PPI b.i.d. and sucralfate.  She is 

also on steroids for her Crohn exacerbation.  The patient is doing better 

and has not had any bleeding in the last 24 hours.  She can be discharged 

home as she is cleared by GI and surgery.  



DISCHARGE MEDICATIONS:  Per electronic medical record and include 

pantoprazole 40 mg b.i.d., sucralfate 4 times a day and prednisone 20 mg 

b.i.d.  



CONDITION ON DISCHARGE:  Stable and improved.



DISCHARGE LOCATION:  Home.



FOLLOWUP

1.  Primary care doctor in 1 week. 

2.  Follow up with GI specialist in 7-10 days.

3.  Follow up with cardiology in 2 weeks. 



 



 _________________________________

KATIE LIANG MD



DD:  08/08/2018 07:23

DT:  08/08/2018 07:26

Job#:  [id number]

## 2018-08-20 ENCOUNTER — HOSPITAL ENCOUNTER (OUTPATIENT)
Dept: HOSPITAL 88 - US | Age: 63
End: 2018-08-20
Attending: INTERNAL MEDICINE
Payer: COMMERCIAL

## 2018-08-20 DIAGNOSIS — R10.10: Primary | ICD-10-CM

## 2018-08-20 PROCEDURE — 76705 ECHO EXAM OF ABDOMEN: CPT

## 2018-08-20 PROCEDURE — 78227 HEPATOBIL SYST IMAGE W/DRUG: CPT

## 2018-08-20 NOTE — DIAGNOSTIC IMAGING REPORT
EXAM: Right Upper Quadrant Ultrasound

INDICATION:        

\S\UPPER ABDOMEN PAIN

COMPARISON: None. 

TECHNIQUE: Transverse and longitudinal images of the right upper abdomen were

obtained. 



FINDINGS:     

Liver:

     Size: 14 cm in the right midclavicular line, normal

     Appearance: Normal echogenicity, smooth contour

     Mass: No focal masses



Gallbladder:

     Stones/Sludge: Sludge present.

     Wall: 0.2 cm

     Appearance: No pericholecystic fluid or hydrops.  

     Sonographic Ley's Sign: Negative



Bile Ducts:

     Intrahepatic Ducts: No dilatation

     Extrahepatic Ducts: Common bile duct measures 0.3 cm, no dilatation



Pancreas:

     Visualized pancreas is unremarkable.



Right Kidney:

     Size: 11.4 cm

     Echogenicity: Normal   

     Parenchymal thickness: Normal 

     Collecting system: No hydronephrosis

     Stones: 0.7 cm midpole calculus.

     Cyst/Mass: None



Vessels:

     Aorta: Not well visualized.

     Inferior Vena Cava: Visualized portions are normal

     Main Portal Vein: 1 cm, normal size with hepatopetal flow.



Free Fluid:

     No ascites or pleural effusion



IMPRESSION:

Gallbladder sludge. No evidence of cholecystitis.

Right renal subcentimeter nonobstructive calculus.





   



Signed by: Dr. Fareed Cuevas MD on 8/20/2018 1:03 PM

## 2018-08-21 NOTE — DIAGNOSTIC IMAGING REPORT
Hepatobiliary Scan with Gallbladder Ejection Fraction



Clinical information: 63 F with upper abdominal pain



Technique: Following intravenous administration of 6.5 millicuries of Tc-99m

mebrofenin, dynamic images of the abdomen in the anterior projection were

obtained through 60 minutes.  Sincalide (CCK analog) 1.5 micrograms was

administered intravenously over 30 minutes with additional imaging for

determination of gallbladder ejection fraction.



Discussion: Perfusion of the liver is normal.  Extraction of tracer by the

liver parenchyma is normal.  Tracer appears promptly within the biliary tract. 

The gallbladder begins to fill at 18 minutes post injection of tracer and fills

adequately.  Tracer is seen in the small bowel by 36 minutes.  There is no

contractile response by the gallbladder to the pharmacologic dose of sincalide.

 No emptying of the gallbladder occurs during the 30 minute infusion.  



Impression: 



1.  Filling of the gallbladder excludes acute cystic duct obstruction/acute

cholecystitis.



2.  The gallbladder ejection fraction is undefined as there is no emptying of

the gallbladder during the infusion of sincalide.  This absence of a

contractile response to sincalide supports the clinical diagnosis of chronic

cholecystitis/gallbladder dyskinesia.



Signed by: Dr. Hannah Burden M.D. on 8/21/2018 12:16 PM

## 2018-10-23 ENCOUNTER — HOSPITAL ENCOUNTER (INPATIENT)
Dept: HOSPITAL 88 - ER | Age: 63
LOS: 7 days | Discharge: HOME | DRG: 329 | End: 2018-10-30
Attending: INTERNAL MEDICINE | Admitting: INTERNAL MEDICINE
Payer: COMMERCIAL

## 2018-10-23 VITALS — HEIGHT: 63 IN | WEIGHT: 154.37 LBS | BODY MASS INDEX: 27.35 KG/M2

## 2018-10-23 DIAGNOSIS — R63.4: ICD-10-CM

## 2018-10-23 DIAGNOSIS — K81.0: ICD-10-CM

## 2018-10-23 DIAGNOSIS — E87.6: ICD-10-CM

## 2018-10-23 DIAGNOSIS — Z79.4: ICD-10-CM

## 2018-10-23 DIAGNOSIS — E11.51: ICD-10-CM

## 2018-10-23 DIAGNOSIS — F17.210: ICD-10-CM

## 2018-10-23 DIAGNOSIS — D64.9: ICD-10-CM

## 2018-10-23 DIAGNOSIS — Z87.11: ICD-10-CM

## 2018-10-23 DIAGNOSIS — K55.1: Primary | ICD-10-CM

## 2018-10-23 DIAGNOSIS — E83.42: ICD-10-CM

## 2018-10-23 DIAGNOSIS — E43: ICD-10-CM

## 2018-10-23 DIAGNOSIS — I10: ICD-10-CM

## 2018-10-23 DIAGNOSIS — J44.9: ICD-10-CM

## 2018-10-23 DIAGNOSIS — E87.1: ICD-10-CM

## 2018-10-23 DIAGNOSIS — E78.5: ICD-10-CM

## 2018-10-23 LAB
ALBUMIN SERPL-MCNC: 3 G/DL (ref 3.5–5)
ALBUMIN/GLOB SERPL: 0.8 {RATIO} (ref 0.8–2)
ALP SERPL-CCNC: 76 IU/L (ref 40–150)
ALT SERPL-CCNC: 13 IU/L (ref 0–55)
ANION GAP SERPL CALC-SCNC: 16.6 MMOL/L (ref 8–16)
BACTERIA URNS QL MICRO: (no result) /HPF
BASOPHILS # BLD AUTO: 0.1 10*3/UL (ref 0–0.1)
BASOPHILS NFR BLD AUTO: 0.9 % (ref 0–1)
BILIRUB UR QL: NEGATIVE
BUN SERPL-MCNC: 12 MG/DL (ref 7–26)
BUN/CREAT SERPL: 14 (ref 6–25)
CALCIUM SERPL-MCNC: 8.9 MG/DL (ref 8.4–10.2)
CAOX CRY URNS QL MICRO: (no result)
CHLORIDE SERPL-SCNC: 95 MMOL/L (ref 98–107)
CK MB SERPL-MCNC: 0.3 NG/ML (ref 0–5)
CK SERPL-CCNC: 32 IU/L (ref 29–168)
CLARITY UR: (no result)
CO2 SERPL-SCNC: 23 MMOL/L (ref 22–29)
COLOR UR: YELLOW
DEPRECATED APTT PLAS QN: 33.2 SECONDS (ref 23.8–35.5)
DEPRECATED INR PLAS: 1.16
DEPRECATED NEUTROPHILS # BLD AUTO: 11.9 10*3/UL (ref 2.1–6.9)
DEPRECATED RBC URNS MANUAL-ACNC: (no result) /HPF (ref 0–5)
EGFRCR SERPLBLD CKD-EPI 2021: > 60 ML/MIN (ref 60–?)
EOSINOPHIL # BLD AUTO: 0 10*3/UL (ref 0–0.4)
EOSINOPHIL NFR BLD AUTO: 0.3 % (ref 0–6)
EPI CELLS URNS QL MICRO: (no result) /LPF
ERYTHROCYTE [DISTWIDTH] IN CORD BLOOD: 17.7 % (ref 11.7–14.4)
GLOBULIN PLAS-MCNC: 3.9 G/DL (ref 2.3–3.5)
GLUCOSE SERPLBLD-MCNC: 158 MG/DL (ref 74–118)
HCT VFR BLD AUTO: 27.6 % (ref 34.2–44.1)
HGB BLD-MCNC: 9.2 G/DL (ref 12–16)
HYALINE CASTS #/AREA URNS LPF: (no result) /[LPF] (ref 0–1)
KETONES UR QL STRIP.AUTO: (no result)
LEUKOCYTE ESTERASE UR QL STRIP.AUTO: (no result)
LIPASE SERPL-CCNC: 56 U/L (ref 8–78)
LYMPHOCYTES # BLD: 1.6 10*3/UL (ref 1–3.2)
LYMPHOCYTES NFR BLD AUTO: 10.9 % (ref 18–39.1)
MAGNESIUM SERPL-MCNC: 1 MG/DL (ref 1.3–2.1)
MCH RBC QN AUTO: 26.7 PG (ref 28–32)
MCHC RBC AUTO-ENTMCNC: 33.3 G/DL (ref 31–35)
MCV RBC AUTO: 80 FL (ref 81–99)
MONOCYTES # BLD AUTO: 1.1 10*3/UL (ref 0.2–0.8)
MONOCYTES NFR BLD AUTO: 7.6 % (ref 4.4–11.3)
MUCOUS THREADS URNS QL MICRO: (no result)
NEUTS SEG NFR BLD AUTO: 79.9 % (ref 38.7–80)
NITRITE UR QL STRIP.AUTO: NEGATIVE
PLATELET # BLD AUTO: 435 X10E3/UL (ref 140–360)
POTASSIUM SERPL-SCNC: 2.6 MMOL/L (ref 3.5–5.1)
PROT UR QL STRIP.AUTO: (no result)
PROTHROMBIN TIME: 15.8 SECONDS (ref 11.9–14.5)
RBC # BLD AUTO: 3.45 X10E6/UL (ref 3.6–5.1)
SODIUM SERPL-SCNC: 132 MMOL/L (ref 136–145)
SP GR UR STRIP: 1.02 (ref 1.01–1.02)
UROBILINOGEN UR STRIP-MCNC: 0.2 MG/DL (ref 0.2–1)
WBC #/AREA URNS HPF: (no result) /HPF (ref 0–5)

## 2018-10-23 PROCEDURE — 74018 RADEX ABDOMEN 1 VIEW: CPT

## 2018-10-23 PROCEDURE — 86920 COMPATIBILITY TEST SPIN: CPT

## 2018-10-23 PROCEDURE — 86900 BLOOD TYPING SEROLOGIC ABO: CPT

## 2018-10-23 PROCEDURE — 82948 REAGENT STRIP/BLOOD GLUCOSE: CPT

## 2018-10-23 PROCEDURE — 82550 ASSAY OF CK (CPK): CPT

## 2018-10-23 PROCEDURE — 81001 URINALYSIS AUTO W/SCOPE: CPT

## 2018-10-23 PROCEDURE — 82150 ASSAY OF AMYLASE: CPT

## 2018-10-23 PROCEDURE — 51700 IRRIGATION OF BLADDER: CPT

## 2018-10-23 PROCEDURE — 80048 BASIC METABOLIC PNL TOTAL CA: CPT

## 2018-10-23 PROCEDURE — 83605 ASSAY OF LACTIC ACID: CPT

## 2018-10-23 PROCEDURE — 82553 CREATINE MB FRACTION: CPT

## 2018-10-23 PROCEDURE — 83735 ASSAY OF MAGNESIUM: CPT

## 2018-10-23 PROCEDURE — 88304 TISSUE EXAM BY PATHOLOGIST: CPT

## 2018-10-23 PROCEDURE — 96367 TX/PROPH/DG ADDL SEQ IV INF: CPT

## 2018-10-23 PROCEDURE — 99284 EMERGENCY DEPT VISIT MOD MDM: CPT

## 2018-10-23 PROCEDURE — 96376 TX/PRO/DX INJ SAME DRUG ADON: CPT

## 2018-10-23 PROCEDURE — 80053 COMPREHEN METABOLIC PANEL: CPT

## 2018-10-23 PROCEDURE — 83690 ASSAY OF LIPASE: CPT

## 2018-10-23 PROCEDURE — 71045 X-RAY EXAM CHEST 1 VIEW: CPT

## 2018-10-23 PROCEDURE — 74177 CT ABD & PELVIS W/CONTRAST: CPT

## 2018-10-23 PROCEDURE — 85025 COMPLETE CBC W/AUTO DIFF WBC: CPT

## 2018-10-23 PROCEDURE — 88307 TISSUE EXAM BY PATHOLOGIST: CPT

## 2018-10-23 PROCEDURE — 93005 ELECTROCARDIOGRAM TRACING: CPT

## 2018-10-23 PROCEDURE — 36415 COLL VENOUS BLD VENIPUNCTURE: CPT

## 2018-10-23 PROCEDURE — 84100 ASSAY OF PHOSPHORUS: CPT

## 2018-10-23 PROCEDURE — 86850 RBC ANTIBODY SCREEN: CPT

## 2018-10-23 PROCEDURE — 85730 THROMBOPLASTIN TIME PARTIAL: CPT

## 2018-10-23 PROCEDURE — 85610 PROTHROMBIN TIME: CPT

## 2018-10-23 PROCEDURE — 84484 ASSAY OF TROPONIN QUANT: CPT

## 2018-10-23 NOTE — XMS REPORT
Clinical Summary

                             Created on: 10/23/2018



Viky Ho

External Reference #: [id number]

: 1955

Sex: Female



Demographics







                          Address                   [address] [place] [address]  [address]

 

                          Home Phone                [phone]

 

                          Preferred Language        English

 

                          Marital Status            Unknown

 

                          Pentecostal Affiliation     Unknown

 

                          Race                      Unknown

 

                          Ethnic Group              Unknown





Author







                          Author                    [facility]

 

                          Organization              [facility]

 

                          Address                   Unknown

 

                          Phone                     Unavailable







Care Team Providers







                    Care Team Member Name    Role                Phone

 

                          PCP                       Unavailable







Allergies

Not on File



Current Medications

Not on file



Active Problems





Not on file



Encounters







    



              Date         Type         Specialty     Care Team     Description

 

    



                     2018          Clinical            [facility]  



                                         Support   



after 10/22/2017



Immunizations







  



                     Name                Dates Previously Given     Next Due

 

  



                           FLUCELVAX QUAD PF (0.5mL     2018 



                                         syringe)  







Social History







    



              Tobacco Use     Types        Packs/Day     Years Used     Date

 

    



                                         Never Assessed    









 



                           Sex Assigned at Birth     Date Recorded

 

 



                                         Not on file 







Last Filed Vital Signs

Not on file



Plan of Treatment







   



                 Health Maintenance     Due Date        Last Done       Comments

 

   



                           CERVICAL CANCER SCREENING     1978  

 

   



                           BREAST CANCER SCREENING     2007  

 

   



                           SHINGRIX VACCINE (#1)     2007  

 

   



                     COLON CANCER SCREENING     2028 

 

   



                     ZOSTER VACCINE      Completed           2017 

 

   



                     INFLUENZA VACCINE     Completed           2018, 10/19/2015 







Results

Not on fileafter 10/22/2017



Insurance







     



            Payer      Benefit     Subscriber ID     Type       Phone      Address



                                         Plan /    



                                         Group    

 

     



                 [facility]             [employer]      xxxxxxxxx       HMO/PPO  



                                         THCARE    



                                         CHOICE/CHO    



                                         ICE +    









     



            Guarantor Name     Account     Relation to     Date of     Phone      Billing Address



                     Type                Patient             Birth  

 

     



            VIKY HO     Personal/F     Self       1955     Home:      [address] [place] [address]               [phone]     [place] [address]

## 2018-10-23 NOTE — XMS REPORT
Patient Summary Document

                             Created on: 10/23/2018



RAJENDRA HUBER

External Reference #: [id number]

: 1955

Sex: Female



Demographics







                          Address                   [address] [place] [address]  [address]

 

                          Home Phone                [phone]

 

                          Preferred Language        Unknown

 

                          Marital Status            Unknown

 

                          Anabaptism Affiliation     Unknown

 

                          Race                      Unknown

 

                                        Additional Race(s)  

 

                          Ethnic Group              Unknown





Author







                          Author                    [facility]

 

                          Address                   Unknown

 

                          Phone                     Unavailable







Care Team Providers







                    Care Team Member Name    Role                Phone

 

                    MELISSA MCHUGH    Unavailable         Unavailable

 

                    KATIE LIANG       Unavailable         Unavailable







Problems

This patient has no known problems.



Allergies, Adverse Reactions, Alerts

This patient has no known allergies or adverse reactions.



Medications

This patient has no known medications.



Results







           Test Description    Test Time    Test Comments    Text Results    Atomic Results    Result

 Comments

 

                HEPTOBILIARY W PHARM    2018 12:13:00                        [place] [address]      Patient Name: 
RAJENDRA HUBER   MR #: [id number]    : 1955 Age/Sex: 63/F  Acct #: 
[id number] Req #: [id number]  Adm Physician:     Ordered by: MELISSA MCHUGH MD  Report #: [id number]   Location: US  Room/Bed:     
______________________________________________________________________________
_____________________    Procedure: [id number] NM/HEPTOBILIARY W PHARM  Exam 
Date: 18                            Exam Time: 1215       REPORT STATUS: 
Signed    Hepatobiliary Scan with Gallbladder Ejection Fraction      Clinical 
information: 63 F with upper abdominal pain      Technique: Following 
intravenous administration of 6.5 millicuries of Tc-99m   mebrofenin, dynamic 
images of the abdomen in the anterior projection were   obtained through 60 
minutes.  Sincalide (CCK analog) 1.5 micrograms was   administered intravenously
over 30 minutes with additional imaging for   determination of gallbladder 
ejection fraction.      Discussion: Perfusion of the liver is normal.  
Extraction of tracer by the   liver parenchyma is normal.  Tracer appears 
promptly within the biliary tract.    The gallbladder begins to fill at 18 
minutes post injection of tracer and fills   adequately.  Tracer is seen in the 
small bowel by 36 minutes.  There is no   contractile response by the 
gallbladder to the pharmacologic dose of sincalide.    No emptying of the 
gallbladder occurs during the 30 minute infusion.        Impression:       1.  
Filling of the gallbladder excludes acute cystic duct obstruction/acute   ch
olecystitis.      2.  The gallbladder ejection fraction is undefined as there is
no emptying of   the gallbladder during the infusion of sincalide.  This absence
of a   contractile response to sincalide supports the clinical diagnosis of 
chronic   cholecystitis/gallbladder dyskinesia.      Signed by: Dr. Marianne Burden M.D. on 2018 12:16 PM        Dictated By: MARIANNE BURDEN MD  Electronically 
Signed By: MARIANNE BURDEN MD on 18 1216  Transcribed By: TELLY on 18 
1216       COPY TO:   MELISSA MCHUGH MD             

 

                 GALLBLADDER    2018 13:00:00                        [place] [address]      Patient Name: 
RAJENDRA HUBER   MR #: [id number]    : 1955 Age/Sex: 63/F  Acct #: 
[id number] Req #: [id number]  Adm Physician:     Ordered by: MELISSA MCHUGH MD  Report #: [id number]   Location:   Room/Bed:     
______________________________________________________________________________
_____________________    Procedure: [id number] US/US GALLBLADDER  Exam Date:     
                       Exam Time:        REPORT STATUS: Signed    EXAM: Right 
Upper Quadrant Ultrasound   INDICATION:              COMPARISON: None.    
TECHNIQUE: Transverse and longitudinal images of the right upper abdomen were   
obtained.       FINDINGS:        Liver:        Size: 14 cm in the right 
midclavicular line, normal        Appearance: Normal echogenicity, smooth 
contour        Mass: No focal masses      Gallbladder:        Stones/Sludge: 
Sludge present.        Wall: 0.2 cm        Appearance: No pericholecystic fluid 
or hydrops.          Sonographic Ley's Sign: Negative      Bile Ducts:       
Intrahepatic Ducts: No dilatation        Extrahepatic Ducts: Common bile duct 
measures 0.3 cm, no dilatation      Pancreas:        Visualized pancreas is 
unremarkable.      Right Kidney:        Size: 11.4 cm        Echogenicity: 
Normal           Parenchymal thickness: Normal         Collecting system: No 
hydronephrosis        Stones: 0.7 cm midpole calculus.        Cyst/Mass: None   
  Vessels:        Aorta: Not well visualized.        Inferior Vena Cava: 
Visualized portions are normal        Main Portal Vein: 1 cm, normal size with 
hepatopetal flow.      Free Fluid:        No ascites or pleural effusion      
IMPRESSION:   Gallbladder sludge. No evidence of cholecystitis.   Right renal 
subcentimeter nonobstructive calculus.                  Signed by: Dr. Fareed Kitchen MD on 2018 1:03 PM        Dictated By: FAREED KITCHEN MD  
Electronically Signed By: FAREED KITCHEN MD on 18 1303  Transcribed By: 
TELLY on 18 1303       COPY TO:   MELISSA MCHUGH MD             

 

                CHEST SINGLE (PORTABLE)    2018 06:36:00                        [place] [address]      Patient 
Name: RAJENDAR HUBER   MR #: [id number]    : 1955 Age/Sex: 63/F  Acct 
#: [id number] Req #: [id number]  Adm Physician: KATIE LIANG MD    Ordered by: 
MELISSA MCHUGH MD  Report #: [id number]   Location: MED/SURG2  Room/Bed: [facility]  
 ____________________________________________________
_______________________________________________    Procedure: [id number] DX/CHEST
SINGLE (PORTABLE)  Exam Date: 18                            Exam Time: 
0555       REPORT STATUS: Signed    CHEST SINGLE (PORTABLE),          Technique:
CHEST SINGLE (PORTABLE)   Comparison: None   Clinical history:  Leukocytosis    
  DISCUSSION:   Unremarkable portable appearance of the heart, mediastinum, 
lungs and pleural   spaces.      IMPRESSION:   No acute abnormality      Signed 
by: Dr Aman Ragland MD on 2018 6:36 AM        Dictated By: AMAN RAGLAND MD  Electronically Signed By: AMAN RAGLAND MD on 18  Transcribed 
By: TELLY on 18       COPY TO:   MELISSA MCHUGH MD

## 2018-10-24 VITALS — SYSTOLIC BLOOD PRESSURE: 107 MMHG | DIASTOLIC BLOOD PRESSURE: 47 MMHG

## 2018-10-24 VITALS — DIASTOLIC BLOOD PRESSURE: 43 MMHG | SYSTOLIC BLOOD PRESSURE: 102 MMHG

## 2018-10-24 VITALS — SYSTOLIC BLOOD PRESSURE: 108 MMHG | DIASTOLIC BLOOD PRESSURE: 44 MMHG

## 2018-10-24 VITALS — DIASTOLIC BLOOD PRESSURE: 49 MMHG | SYSTOLIC BLOOD PRESSURE: 128 MMHG

## 2018-10-24 VITALS — DIASTOLIC BLOOD PRESSURE: 54 MMHG | SYSTOLIC BLOOD PRESSURE: 124 MMHG

## 2018-10-24 VITALS — DIASTOLIC BLOOD PRESSURE: 35 MMHG | SYSTOLIC BLOOD PRESSURE: 113 MMHG

## 2018-10-24 VITALS — DIASTOLIC BLOOD PRESSURE: 48 MMHG | SYSTOLIC BLOOD PRESSURE: 102 MMHG

## 2018-10-24 VITALS — SYSTOLIC BLOOD PRESSURE: 117 MMHG | DIASTOLIC BLOOD PRESSURE: 52 MMHG

## 2018-10-24 VITALS — DIASTOLIC BLOOD PRESSURE: 44 MMHG | SYSTOLIC BLOOD PRESSURE: 106 MMHG

## 2018-10-24 VITALS — DIASTOLIC BLOOD PRESSURE: 40 MMHG | SYSTOLIC BLOOD PRESSURE: 103 MMHG

## 2018-10-24 VITALS — SYSTOLIC BLOOD PRESSURE: 125 MMHG | DIASTOLIC BLOOD PRESSURE: 55 MMHG

## 2018-10-24 VITALS — SYSTOLIC BLOOD PRESSURE: 134 MMHG | DIASTOLIC BLOOD PRESSURE: 60 MMHG

## 2018-10-24 VITALS — DIASTOLIC BLOOD PRESSURE: 45 MMHG | SYSTOLIC BLOOD PRESSURE: 112 MMHG

## 2018-10-24 VITALS — DIASTOLIC BLOOD PRESSURE: 52 MMHG | SYSTOLIC BLOOD PRESSURE: 139 MMHG

## 2018-10-24 VITALS — DIASTOLIC BLOOD PRESSURE: 41 MMHG | SYSTOLIC BLOOD PRESSURE: 107 MMHG

## 2018-10-24 VITALS — SYSTOLIC BLOOD PRESSURE: 130 MMHG | DIASTOLIC BLOOD PRESSURE: 50 MMHG

## 2018-10-24 VITALS — DIASTOLIC BLOOD PRESSURE: 49 MMHG | SYSTOLIC BLOOD PRESSURE: 130 MMHG

## 2018-10-24 VITALS — SYSTOLIC BLOOD PRESSURE: 125 MMHG | DIASTOLIC BLOOD PRESSURE: 51 MMHG

## 2018-10-24 VITALS — SYSTOLIC BLOOD PRESSURE: 134 MMHG | DIASTOLIC BLOOD PRESSURE: 52 MMHG

## 2018-10-24 VITALS — SYSTOLIC BLOOD PRESSURE: 148 MMHG | DIASTOLIC BLOOD PRESSURE: 70 MMHG

## 2018-10-24 VITALS — SYSTOLIC BLOOD PRESSURE: 102 MMHG | DIASTOLIC BLOOD PRESSURE: 39 MMHG

## 2018-10-24 VITALS — DIASTOLIC BLOOD PRESSURE: 45 MMHG | SYSTOLIC BLOOD PRESSURE: 106 MMHG

## 2018-10-24 VITALS — SYSTOLIC BLOOD PRESSURE: 124 MMHG | DIASTOLIC BLOOD PRESSURE: 52 MMHG

## 2018-10-24 VITALS — SYSTOLIC BLOOD PRESSURE: 106 MMHG | DIASTOLIC BLOOD PRESSURE: 45 MMHG

## 2018-10-24 VITALS — DIASTOLIC BLOOD PRESSURE: 56 MMHG | SYSTOLIC BLOOD PRESSURE: 124 MMHG

## 2018-10-24 VITALS — SYSTOLIC BLOOD PRESSURE: 115 MMHG | DIASTOLIC BLOOD PRESSURE: 43 MMHG

## 2018-10-24 VITALS — SYSTOLIC BLOOD PRESSURE: 110 MMHG | DIASTOLIC BLOOD PRESSURE: 49 MMHG

## 2018-10-24 VITALS — SYSTOLIC BLOOD PRESSURE: 113 MMHG | DIASTOLIC BLOOD PRESSURE: 49 MMHG

## 2018-10-24 VITALS — SYSTOLIC BLOOD PRESSURE: 109 MMHG | DIASTOLIC BLOOD PRESSURE: 46 MMHG

## 2018-10-24 VITALS — SYSTOLIC BLOOD PRESSURE: 102 MMHG | DIASTOLIC BLOOD PRESSURE: 45 MMHG

## 2018-10-24 VITALS — SYSTOLIC BLOOD PRESSURE: 137 MMHG | DIASTOLIC BLOOD PRESSURE: 57 MMHG

## 2018-10-24 VITALS — SYSTOLIC BLOOD PRESSURE: 109 MMHG | DIASTOLIC BLOOD PRESSURE: 43 MMHG

## 2018-10-24 VITALS — DIASTOLIC BLOOD PRESSURE: 49 MMHG | SYSTOLIC BLOOD PRESSURE: 113 MMHG

## 2018-10-24 VITALS — DIASTOLIC BLOOD PRESSURE: 44 MMHG | SYSTOLIC BLOOD PRESSURE: 111 MMHG

## 2018-10-24 VITALS — SYSTOLIC BLOOD PRESSURE: 97 MMHG | DIASTOLIC BLOOD PRESSURE: 48 MMHG

## 2018-10-24 VITALS — DIASTOLIC BLOOD PRESSURE: 54 MMHG | SYSTOLIC BLOOD PRESSURE: 130 MMHG

## 2018-10-24 VITALS — SYSTOLIC BLOOD PRESSURE: 126 MMHG | DIASTOLIC BLOOD PRESSURE: 49 MMHG

## 2018-10-24 VITALS — SYSTOLIC BLOOD PRESSURE: 127 MMHG | DIASTOLIC BLOOD PRESSURE: 53 MMHG

## 2018-10-24 VITALS — SYSTOLIC BLOOD PRESSURE: 125 MMHG | DIASTOLIC BLOOD PRESSURE: 52 MMHG

## 2018-10-24 VITALS — SYSTOLIC BLOOD PRESSURE: 118 MMHG | DIASTOLIC BLOOD PRESSURE: 47 MMHG

## 2018-10-24 VITALS — SYSTOLIC BLOOD PRESSURE: 113 MMHG | DIASTOLIC BLOOD PRESSURE: 45 MMHG

## 2018-10-24 VITALS — DIASTOLIC BLOOD PRESSURE: 50 MMHG | SYSTOLIC BLOOD PRESSURE: 122 MMHG

## 2018-10-24 VITALS — SYSTOLIC BLOOD PRESSURE: 100 MMHG | DIASTOLIC BLOOD PRESSURE: 38 MMHG

## 2018-10-24 LAB
ALBUMIN SERPL-MCNC: 2.2 G/DL (ref 3.5–5)
ALBUMIN/GLOB SERPL: 0.7 {RATIO} (ref 0.8–2)
ALP SERPL-CCNC: 59 IU/L (ref 40–150)
ALT SERPL-CCNC: 32 IU/L (ref 0–55)
AMYLASE SERPL-CCNC: 25 U/L (ref 25–125)
ANION GAP SERPL CALC-SCNC: 11.8 MMOL/L (ref 8–16)
BASOPHILS # BLD AUTO: 0.1 10*3/UL (ref 0–0.1)
BASOPHILS NFR BLD AUTO: 0.3 % (ref 0–1)
BUN SERPL-MCNC: 8 MG/DL (ref 7–26)
BUN/CREAT SERPL: 13 (ref 6–25)
CALCIUM SERPL-MCNC: 8 MG/DL (ref 8.4–10.2)
CHLORIDE SERPL-SCNC: 100 MMOL/L (ref 98–107)
CK MB SERPL-MCNC: 0.7 NG/ML (ref 0–5)
CK MB SERPL-MCNC: 0.7 NG/ML (ref 0–5)
CK SERPL-CCNC: 39 IU/L (ref 29–168)
CK SERPL-CCNC: 44 IU/L (ref 29–168)
CO2 SERPL-SCNC: 24 MMOL/L (ref 22–29)
DEPRECATED NEUTROPHILS # BLD AUTO: 18.8 10*3/UL (ref 2.1–6.9)
EGFRCR SERPLBLD CKD-EPI 2021: > 60 ML/MIN (ref 60–?)
EOSINOPHIL # BLD AUTO: 0 10*3/UL (ref 0–0.4)
EOSINOPHIL NFR BLD AUTO: 0 % (ref 0–6)
ERYTHROCYTE [DISTWIDTH] IN CORD BLOOD: 17.6 % (ref 11.7–14.4)
GLOBULIN PLAS-MCNC: 3.3 G/DL (ref 2.3–3.5)
GLUCOSE SERPLBLD-MCNC: 157 MG/DL (ref 74–118)
HCT VFR BLD AUTO: 26.8 % (ref 34.2–44.1)
HGB BLD-MCNC: 9 G/DL (ref 12–16)
LIPASE SERPL-CCNC: 4 U/L (ref 8–78)
LYMPHOCYTES # BLD: 0.8 10*3/UL (ref 1–3.2)
LYMPHOCYTES NFR BLD AUTO: 3.7 % (ref 18–39.1)
MCH RBC QN AUTO: 27 PG (ref 28–32)
MCHC RBC AUTO-ENTMCNC: 33.6 G/DL (ref 31–35)
MCV RBC AUTO: 80.5 FL (ref 81–99)
MONOCYTES # BLD AUTO: 1.1 10*3/UL (ref 0.2–0.8)
MONOCYTES NFR BLD AUTO: 5.1 % (ref 4.4–11.3)
NEUTS SEG NFR BLD AUTO: 90.4 % (ref 38.7–80)
PLATELET # BLD AUTO: 416 X10E3/UL (ref 140–360)
POTASSIUM SERPL-SCNC: 2.8 MMOL/L (ref 3.5–5.1)
RBC # BLD AUTO: 3.33 X10E6/UL (ref 3.6–5.1)
SODIUM SERPL-SCNC: 133 MMOL/L (ref 136–145)

## 2018-10-24 PROCEDURE — 30233N1 TRANSFUSION OF NONAUTOLOGOUS RED BLOOD CELLS INTO PERIPHERAL VEIN, PERCUTANEOUS APPROACH: ICD-10-PCS | Performed by: SURGERY

## 2018-10-24 PROCEDURE — 0DTB0ZZ RESECTION OF ILEUM, OPEN APPROACH: ICD-10-PCS | Performed by: SURGERY

## 2018-10-24 PROCEDURE — 0FT40ZZ RESECTION OF GALLBLADDER, OPEN APPROACH: ICD-10-PCS | Performed by: SURGERY

## 2018-10-24 PROCEDURE — 0DTF0ZZ RESECTION OF RIGHT LARGE INTESTINE, OPEN APPROACH: ICD-10-PCS | Performed by: SURGERY

## 2018-10-24 RX ADMIN — HYDROMORPHONE HYDROCHLORIDE PRN MG: 2 INJECTION INTRAMUSCULAR; INTRAVENOUS; SUBCUTANEOUS at 21:24

## 2018-10-24 RX ADMIN — SODIUM CHLORIDE SCH ML: 900 IRRIGANT IRRIGATION at 05:45

## 2018-10-24 RX ADMIN — METRONIDAZOLE SCH MLS/HR: 500 INJECTION, SOLUTION INTRAVENOUS at 17:22

## 2018-10-24 RX ADMIN — METRONIDAZOLE SCH MLS/HR: 500 INJECTION, SOLUTION INTRAVENOUS at 02:39

## 2018-10-24 RX ADMIN — SODIUM CHLORIDE SCH ML: 900 IRRIGANT IRRIGATION at 21:42

## 2018-10-24 RX ADMIN — METRONIDAZOLE SCH MLS/HR: 500 INJECTION, SOLUTION INTRAVENOUS at 13:34

## 2018-10-24 RX ADMIN — SODIUM CHLORIDE SCH MLS/HR: 9 INJECTION, SOLUTION INTRAVENOUS at 13:43

## 2018-10-24 RX ADMIN — TAZOBACTAM SODIUM AND PIPERACILLIN SODIUM SCH MLS/HR: 375; 3 INJECTION, SOLUTION INTRAVENOUS at 13:34

## 2018-10-24 RX ADMIN — SODIUM CHLORIDE SCH MLS/HR: 9 INJECTION, SOLUTION INTRAVENOUS at 05:43

## 2018-10-24 RX ADMIN — HYDROMORPHONE HYDROCHLORIDE PRN MG: 2 INJECTION INTRAMUSCULAR; INTRAVENOUS; SUBCUTANEOUS at 16:36

## 2018-10-24 RX ADMIN — SODIUM CHLORIDE SCH MLS/HR: 9 INJECTION, SOLUTION INTRAVENOUS at 21:42

## 2018-10-24 RX ADMIN — INSULIN HUMAN SCH UNIT: 100 INJECTION, SOLUTION PARENTERAL at 12:00

## 2018-10-24 RX ADMIN — HYDROMORPHONE HYDROCHLORIDE PRN MG: 2 INJECTION INTRAMUSCULAR; INTRAVENOUS; SUBCUTANEOUS at 10:50

## 2018-10-24 RX ADMIN — SODIUM CHLORIDE SCH MG: 900 INJECTION INTRAVENOUS at 05:45

## 2018-10-24 RX ADMIN — SODIUM CHLORIDE SCH ML: 900 IRRIGANT IRRIGATION at 09:02

## 2018-10-24 RX ADMIN — METRONIDAZOLE SCH MLS/HR: 500 INJECTION, SOLUTION INTRAVENOUS at 23:58

## 2018-10-24 RX ADMIN — TAZOBACTAM SODIUM AND PIPERACILLIN SODIUM SCH MLS/HR: 375; 3 INJECTION, SOLUTION INTRAVENOUS at 17:23

## 2018-10-24 RX ADMIN — INSULIN HUMAN SCH UNIT: 100 INJECTION, SOLUTION PARENTERAL at 18:00

## 2018-10-24 RX ADMIN — SODIUM CHLORIDE SCH ML: 900 IRRIGANT IRRIGATION at 13:34

## 2018-10-24 RX ADMIN — SODIUM CHLORIDE SCH ML: 900 IRRIGANT IRRIGATION at 17:20

## 2018-10-24 RX ADMIN — INSULIN HUMAN SCH UNIT: 100 INJECTION, SOLUTION PARENTERAL at 06:00

## 2018-10-24 RX ADMIN — METOPROLOL TARTRATE SCH MG: 1 INJECTION, SOLUTION INTRAVENOUS at 21:43

## 2018-10-24 RX ADMIN — TAZOBACTAM SODIUM AND PIPERACILLIN SODIUM SCH MLS/HR: 375; 3 INJECTION, SOLUTION INTRAVENOUS at 06:00

## 2018-10-24 RX ADMIN — METRONIDAZOLE SCH MLS/HR: 500 INJECTION, SOLUTION INTRAVENOUS at 06:00

## 2018-10-24 RX ADMIN — TAZOBACTAM SODIUM AND PIPERACILLIN SODIUM SCH MLS/HR: 375; 3 INJECTION, SOLUTION INTRAVENOUS at 02:30

## 2018-10-24 RX ADMIN — MAGNESIUM SULFATE IN WATER PRN MLS/HR: 40 INJECTION, SOLUTION INTRAVENOUS at 16:36

## 2018-10-24 RX ADMIN — TAZOBACTAM SODIUM AND PIPERACILLIN SODIUM SCH MLS/HR: 375; 3 INJECTION, SOLUTION INTRAVENOUS at 23:17

## 2018-10-24 NOTE — XMS REPORT
Clinical Summary

                             Created on: 10/24/2018



Viky Ho

External Reference #: [id number]

: 1955

Sex: Female



Demographics







                          Address                   [address] [place] [address]  [address]

 

                          Home Phone                [phone]

 

                          Preferred Language        English

 

                          Marital Status            Unknown

 

                          Advent Affiliation     Unknown

 

                          Race                      Unknown

 

                          Ethnic Group              Unknown





Author







                          Author                    [facility]

 

                          Organization              [facility]

 

                          Address                   Unknown

 

                          Phone                     Unavailable







Care Team Providers







                    Care Team Member Name    Role                Phone

 

                          PCP                       Unavailable







Allergies

Not on File



Current Medications

Not on file



Active Problems





Not on file



Encounters







    



              Date         Type         Specialty     Care Team     Description

 

    



                     2018          Clinical            [facility]  



                                         Support   



after 10/23/2017



Immunizations







  



                     Name                Dates Previously Given     Next Due

 

  



                           FLUCELVAX QUAD PF (0.5mL     2018 



                                         syringe)  







Social History







    



              Tobacco Use     Types        Packs/Day     Years Used     Date

 

    



                                         Never Assessed    









 



                           Sex Assigned at Birth     Date Recorded

 

 



                                         Not on file 







Last Filed Vital Signs

Not on file



Plan of Treatment







   



                 Health Maintenance     Due Date        Last Done       Comments

 

   



                           CERVICAL CANCER SCREENING     1978  

 

   



                           BREAST CANCER SCREENING     2007  

 

   



                           SHINGRIX VACCINE (#1)     2007  

 

   



                     COLON CANCER SCREENING     2028 

 

   



                     ZOSTER VACCINE      Completed           2017 

 

   



                     INFLUENZA VACCINE     Completed           2018, 10/19/2015 







Results

Not on fileafter 10/23/2017



Insurance







     



            Payer      Benefit     Subscriber ID     Type       Phone      Address



                                         Plan /    



                                         Group    

 

     



                 [facility]             [employer]      xxxxxxxxx       HMO/PPO  



                                         THCARE    



                                         CHOICE/CHO    



                                         ICE +    









     



            Guarantor Name     Account     Relation to     Date of     Phone      Billing Address



                     Type                Patient             Birth  

 

     



            VIKY HO     Personal/F     Self       1955     Home:      [address] [place] [address]               [phone]     [place] [address]

## 2018-10-24 NOTE — XMS REPORT
Clinical Summary

                             Created on: 10/24/2018



Viky Ho

External Reference #: [id number]

: 1955

Sex: Female



Demographics







                          Address                   [address] [place] [address]  [address]

 

                          Home Phone                [phone]

 

                          Preferred Language        English

 

                          Marital Status            Unknown

 

                          Muslim Affiliation     Unknown

 

                          Race                      Unknown

 

                          Ethnic Group              Unknown





Author







                          Author                    [facility]

 

                          Organization              [facility]

 

                          Address                   Unknown

 

                          Phone                     Unavailable







Care Team Providers







                    Care Team Member Name    Role                Phone

 

                          PCP                       Unavailable







Allergies

Not on File



Current Medications

Not on file



Active Problems





Not on file



Encounters







    



              Date         Type         Specialty     Care Team     Description

 

    



                     2018          Clinical            [facility]  



                                         Support   



after 10/23/2017



Immunizations







  



                     Name                Dates Previously Given     Next Due

 

  



                           FLUCELVAX QUAD PF (0.5mL     2018 



                                         syringe)  







Social History







    



              Tobacco Use     Types        Packs/Day     Years Used     Date

 

    



                                         Never Assessed    









 



                           Sex Assigned at Birth     Date Recorded

 

 



                                         Not on file 







Last Filed Vital Signs

Not on file



Plan of Treatment







   



                 Health Maintenance     Due Date        Last Done       Comments

 

   



                           CERVICAL CANCER SCREENING     1978  

 

   



                           BREAST CANCER SCREENING     2007  

 

   



                           SHINGRIX VACCINE (#1)     2007  

 

   



                     COLON CANCER SCREENING     2028 

 

   



                     ZOSTER VACCINE      Completed           2017 

 

   



                     INFLUENZA VACCINE     Completed           2018, 10/19/2015 







Results

Not on fileafter 10/23/2017



Insurance







     



            Payer      Benefit     Subscriber ID     Type       Phone      Address



                                         Plan /    



                                         Group    

 

     



                 [facility]             [employer]      xxxxxxxxx       HMO/PPO  



                                         THCARE    



                                         CHOICE/CHO    



                                         ICE +    









     



            Guarantor Name     Account     Relation to     Date of     Phone      Billing Address



                     Type                Patient             Birth  

 

     



            VIKY HO     Personal/F     Self       1955     Home:      [address] [place] [address]               [phone]     [place] [address]

## 2018-10-24 NOTE — CONSULTATION
DATE OF CONSULTATION:  2018 



CARDIOLOGY CONSULTATION



HISTORY:  A 63-year-old lady who is diabetic, hypertensive, hyperlipidemic. 

 Patient was at this institution in 2018.  At that time, patient had 

tachycardia, chest pain, and minimal leaks of cardiac enzymes.  Because of 

her symptoms of angina and with her tachycardia and the leak of enzymes and 

her risk factors, plan was initially to proceed with cardiac 

catheterization to evaluate this problem.  However, patient given 300 mg of 

Plavix, and she had major hematuria.  Her Plavix was stopped at the same 

time during that stay, she had EGD done by Dr. Manoj Sanchez, which showed 

severe large gastric ulcer.  The patient treated for her ulcer.  She 

continued on medical therapy.  The patient supposed to follow up with Dr. Sanchez shortly to have repeat EGD for followup on her very large gastric 

ulcer.  However, for the last 2 to 3 days, she is having diffuse abdominal 

pain.  She came to the emergency room early morning hour with acute 

abdomen.  CT scan showed evidence of ischemic colitis.  Patient rushed to 

surgery, had partial resection of the small bowel and cholecystectomy.  

Patient in intensive care unit.  Cardiac consultation is obtained because 

of her cardiac history.



Patient's symptoms prior to the admission were mainly of GI origin with 

acute abdomen, nausea, and very severe abdominal pain.  Of note, her CT 

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 

artery.  Her hematuria seems to be resolved.  Prior to this illness, she 

was having moderate shortness of breath on exertion and chest tightness.  

There were no orthopnea, no paroxysmal nocturnal dyspnea.  Interestingly, 

patient has history of weight loss for almost 70 pounds in the last few 

months.  She does have chronic diarrhea.  She was diagnosed of possibility 

of Crohn's disease or irritable bowel syndrome.  Regarding her diabetes 

mellitus, hypertension, hypercholesterolemia, patient is on appropriate 

therapy.



CURRENT MEDICATIONS

1. Amlodipine 5 mg a day.

2. Lovastatin 20 mg a day.

3. Losartan/hydrochlorothiazide 100/25 one tablet a day.

4. Atenolol 25 mg twice a day.

5. Colestipol 1 g 2 tablets daily.

6. Glimepiride 1 mg daily.

7. Dicyclomine 2 capsules t.i.d.

8. Protonix 40 mg a day.

9. Carafate 1 g daily.

10. Pepcid.

11. Probiotic.



ALLERGIES:  LIPITOR CAUSING IN THE PAST ELEVATED LIVER ENZYMES.



PAST MEDICAL HISTORY

1. Hypertension.

2. Diabetes mellitus.

3. Hyperlipidemia.

4. History of proteinuria.

5. Major gastric ulcer.

6. Possible Crohn's disease.

7. Gross hematuria.



SOCIAL HISTORY:  She is single.  She is an ex-smoker.  She is not a social 

alcohol drinker.  She is a .



FAMILY HISTORY:  Father  at age 55 with complication of alcoholism.  

Mother  in her 70s with kidney and liver disease as she was diabetic.  

Nine siblings, few are diabetic.  No children.



REVIEW OF SYSTEMS

GENERAL:  Fever, chills.

HEENT:  Unremarkable.

CARDIAC:  As per above.

PULMONARY:  As per above.

GI:  As per acute illness, basically presentation was acute abdomen.  Prior 

to that, patient having abdominal pain and diarrhea.

:  No frequency.

MUSCULOSKELETAL:  No back pain.

SKIN:  Occasional rashes.

NEUROLOGICAL:  No seizure activity or weakness.



PHYSICAL EXAMINATION

VITALS:  Height of 5 feet 3 inches, weight of 136 pounds, blood pressure 

130/70, heart rate of 80, respiratory rate of 18.

HEENT:  Pupils are reactive.

NECK:  No elevation of jugular venous pulsation.

CHEST:  Clear to auscultation and percussion.

HEART:  Holosystolic murmur noted.

ABDOMEN:  There is binder in place.  Drainage is in place.

EXTREMITIES:  No cyanosis.  No clubbing.  No edema.

NEUROLOGIC:  Nonfocal.

OTHER FINDINGS:  NG tube suction.



IMPRESSION

1. Acute abdomen, status post surgery.

2. History of coronary artery disease.

3. Hypertension.

4. Diabetes mellitus with complications.

5. Mild aortic stenosis.



PLAN:  Cardiac wise, my recommendation will be supportive.  Antibiotics 

coverage.  IV fluids.  Beta blockers, and we will adjust medication as 

treatment needed.





We will follow patient's progression with you and would like to thank you 

for your kind referral.







DD:  10/24/2018 12:23

DT:  10/24/2018 13:27

Job#:  [id number]

## 2018-10-24 NOTE — XMS REPORT
Clinical Summary

                             Created on: 10/24/2018



Viky Ho

External Reference #: [id number]

: 1955

Sex: Female



Demographics







                          Address                   [address] [place] [address]  [address]

 

                          Home Phone                [phone]

 

                          Preferred Language        English

 

                          Marital Status            Unknown

 

                          [employer] Affiliation     Unknown

 

                          Race                      Unknown

 

                          Ethnic Group              Unknown





Author







                          Author                    [facility]

 

                          Organization              [facility]

 

                          Address                   Unknown

 

                          Phone                     Unavailable







Care Team Providers







                    Care Team Member Name    Role                Phone

 

                          PCP                       Unavailable







Allergies

Not on File



Current Medications

Not on file



Active Problems





Not on file



Encounters







    



              Date         Type         Specialty     Care Team     Description

 

    



                     2018          Clinical            [facility]  



                                         Support   



after 10/23/2017



Immunizations







  



                     Name                Dates Previously Given     Next Due

 

  



                           FLUCELVAX QUAD PF (0.5mL     2018 



                                         syringe)  







Social History







    



              Tobacco Use     Types        Packs/Day     Years Used     Date

 

    



                                         Never Assessed    









 



                           Sex Assigned at Birth     Date Recorded

 

 



                                         Not on file 







Last Filed Vital Signs

Not on file



Plan of Treatment







   



                 Health Maintenance     Due Date        Last Done       Comments

 

   



                           CERVICAL CANCER SCREENING     1978  

 

   



                           BREAST CANCER SCREENING     2007  

 

   



                           SHINGRIX VACCINE (#1)     2007  

 

   



                     COLON CANCER SCREENING     2028 

 

   



                     ZOSTER VACCINE      Completed           2017 

 

   



                     INFLUENZA VACCINE     Completed           2018, 10/19/2015 







Results

Not on fileafter 10/23/2017



Insurance







     



            Payer      Benefit     Subscriber ID     Type       Phone      Address



                                         Plan /    



                                         Group    

 

     



                 [facility]             [employer]      xxxxxxxxx       HMO/PPO  



                                         THCARE    



                                         CHOICE/CHO    



                                         ICE +    









     



            Guarantor Name     Account     Relation to     Date of     Phone      Billing Address



                     Type                Patient             Birth  

 

     



            VIKY HO     Personal/F     Self       1955     Home:      [address] [place] [address]               [phone]     [place] [address]

## 2018-10-24 NOTE — DIAGNOSTIC IMAGING REPORT
CHEST SINGLE (PORTABLE), 10/23/2018 9:09 PM



Technique: CHEST SINGLE (PORTABLE)

Comparison: 8/6/2018.

Clinical history: Abdominal pain



Findings:

Stable appearance of the heart and mediastinal silhouette, lungs, and pleural

spaces.



Impression:

1. Lines/Tubes: None

2. No acute abnormality.



Signed by: Dr Tamika Ragland MD on 10/24/2018 12:02 AM

## 2018-10-24 NOTE — DIAGNOSTIC IMAGING REPORT
EXAM: CT ABDOMEN/PELVIS W

DATE: 10/23/2018 9:09 PM  

INDICATION:  Right lower quadrant abdominal pain

COMPARISON:  None 

TECHNIQUE: The abdomen and pelvis were scanned using a multidetector helical

scanner. Coronal and sagittal reformations were obtained.  CT low dose

techniques were utilized, as applicable.

IV Contrast:  100 ml Isovue 300/370



FINDINGS:

LOWER THORAX: No consolidations



LIVER/BILIARY: Hepatic steatosis. No masses or ductal dilation. Portal venous

gas.



GALLBLADDER: No stones. Trace nonspecific pericholecystic fluid

SPLEEN: Spleen is small and heterogeneous in appearance with peripheral areas

of hyperenhancement or perfusional change.

PANCREAS: Unremarkable



ADRENALS: No nodules

KIDNEYS: No suspicious renal masses.  No hydronephrosis.     

GI TRACT: There is hypoenhancement of a long segment of distal small bowel in

the right lower abdomen with pneumatosis and venous gas.



VESSELS: Severe atherosclerotic changes throughout the aorta and branch

vessels. Severe narrowing and/or occlusion of the celiac and SMA origins, but

patent distally

PERITONEUM/RETROPERITONEUM: No free air or fluid

LYMPH NODES: No lymphadenopathy



REPRODUCTIVE ORGANS/BLADDER: There is a 10 cm calcified dominant fibroid.

Nonspecific 3.3 cm simple appearing left adnexal cyst.



BONES: Degenerative changes, worse at L5-S1..



IMPRESSION:

1. Ischemic distal small bowel in the lower abdomen with hypoenhancement,

pneumatosis, and portal venous gas. 

2. Severe atherosclerotic changes with narrowing/occlusion of the celiac and

SMA origins, but patent distally.



Discussed with Physician: JOEY JEFFERY MD at 12:15 AM on 10/24/2018.



Signed by: Dr Tamika Ragland MD on 10/24/2018 12:22 AM

## 2018-10-24 NOTE — OPERATIVE REPORT
DATE OF PROCEDURE:  October 24, 2018 



PREOPERATIVE DIAGNOSIS:  Acute surgical abdomen, rule out ischemic bowel.



POSTOPERATIVE DIAGNOSES 

1.  Acute surgical abdomen.  

2.  Ischemic terminal ileum and right colon.

3.  Acalculous cholecystitis.



OPERATIONS PERFORMED

1.  Exploratory laparotomy.  

2.  Right colectomy with resection of terminal ileum.  

3.  Cholecystectomy.  



ANESTHESIA:  General.



COMPLICATIONS:  None.



ESTIMATED BLOOD LOSS:  50 mL.



DESCRIPTION OF PROCEDURE:  With the patient lying in bed in the supine 

position under good general endotracheal anesthesia, the abdomen was 

prepped with Betadine solution and draped in the usual manner.  A lower 

midline incision was made.  It was carried down through the subcutaneous 

tissue and through the midline fascia.  The peritoneum was opened and the 

abdomen was entered.  Upon entering the abdominal cavity, immediately a 

foul odor was encountered.  Exploration revealed that about 3 feet of 

terminal ileum was dusky appearing and had some obvious color change with 

bruises in the serosa and some thickening of the bowel.  Similarly, the 

cecum itself appeared to be also starting to have some of the same changes. 

 The proximal bowel appeared to be normal.  The small bowel was run and was 

found to be normal.  The colon distal to the hepatic flexure also appeared 

to be normal.  Examination of the upper abdomen revealed a tensely 

distended discolored gallbladder consistent with also acalculous 

cholecystitis.  The patient had a history of having a nonfunctioning 

gallbladder now for a while.  So, we decided that the bowel would need to 

be resected, as well as the gallbladder needed to be removed.  We went 

proximally in the terminal ileum to where the bowel looked pretty much 

normal.  It was then divided at this point with an application of the 

GLENDY-75 stapler.  Similarly, at the level of the midtransverse colon, the 

colon was divided with an application of a GLENDY-75 stapler.  The right colon 

was then mobilized off the lateral gutter without any difficulty and 

brought medially.  The mesentery of the terminal ileum and colon was then 

divided with the In-Seal device and the specimen was sent for pathological 

examination.  After this was done, the peritoneum overlying the neck of the 

gallbladder was then opened.  The cystic duct was identified.  A 2-0 silk 

tie was placed around the cystic duct and left in place.  The cystic artery 

was identified, ligated with 2-0 silk and divided.  The gallbladder was 

then sharply taken off of the liver bed using the cautery.  There was a lot 

of thickening, fibrosis and inflammatory reaction of the liver bed 

consistent with a chronic cholecystitis problem.  At the neck of the 

gallbladder, the cystic artery was then again identified.  It had 

previously been tied and was then divided.  The cystic duct was then 

ligated with 2-0 silk and divided.  The gallbladder was sent for 

pathological examination.  Perfect hemostasis of the liver bed was 

ascertained.  The anastomosis was then performed by bringing the small 

bowel up to the midtransverse colon.  Another application of the GLENDY-75 

stapler was used, and the remaining opening was closed with a TA-60 

stapler.  Gloves and instruments were changed.  Anastomosis was then 

reinforced with 3-0 silk.  The mesenteric rent was closed with a running 

suture of 2-0 Vicryl.  The abdomen was then copiously irrigated and all the 

excess fluid was aspirated.  Perfect hemostasis was ascertained.  Of note, 

also is the fact that the patient has a very large fibroid that occupies 

most of the pelvis.  The bowel was then inspected again and no other areas 

of suspicious ischemic were encountered.  So, the abdomen was then closed 

in layers.  The peritoneum was closed with a running suture of #1 Vicryl.  

The midline fascia was closed with a running suture of #1 Vicryl.  The skin 

was closed with clips.  A 

dressing was applied.  The sponge, lap and needle count was correct.  The 

patient tolerated the procedure well and returned to the recovery room in 

stable condition.  



  





DD:  10/24/2018 05:54

DT:  10/24/2018 06:13

Job#:  [id number]

## 2018-10-25 VITALS — DIASTOLIC BLOOD PRESSURE: 46 MMHG | SYSTOLIC BLOOD PRESSURE: 112 MMHG

## 2018-10-25 VITALS — SYSTOLIC BLOOD PRESSURE: 121 MMHG | DIASTOLIC BLOOD PRESSURE: 44 MMHG

## 2018-10-25 VITALS — DIASTOLIC BLOOD PRESSURE: 48 MMHG | SYSTOLIC BLOOD PRESSURE: 120 MMHG

## 2018-10-25 VITALS — SYSTOLIC BLOOD PRESSURE: 131 MMHG | DIASTOLIC BLOOD PRESSURE: 58 MMHG

## 2018-10-25 VITALS — SYSTOLIC BLOOD PRESSURE: 104 MMHG | DIASTOLIC BLOOD PRESSURE: 44 MMHG

## 2018-10-25 VITALS — SYSTOLIC BLOOD PRESSURE: 117 MMHG | DIASTOLIC BLOOD PRESSURE: 45 MMHG

## 2018-10-25 VITALS — SYSTOLIC BLOOD PRESSURE: 111 MMHG | DIASTOLIC BLOOD PRESSURE: 54 MMHG

## 2018-10-25 VITALS — SYSTOLIC BLOOD PRESSURE: 131 MMHG | DIASTOLIC BLOOD PRESSURE: 60 MMHG

## 2018-10-25 VITALS — DIASTOLIC BLOOD PRESSURE: 63 MMHG | SYSTOLIC BLOOD PRESSURE: 142 MMHG

## 2018-10-25 VITALS — SYSTOLIC BLOOD PRESSURE: 115 MMHG | DIASTOLIC BLOOD PRESSURE: 51 MMHG

## 2018-10-25 VITALS — DIASTOLIC BLOOD PRESSURE: 41 MMHG | SYSTOLIC BLOOD PRESSURE: 122 MMHG

## 2018-10-25 VITALS — SYSTOLIC BLOOD PRESSURE: 110 MMHG | DIASTOLIC BLOOD PRESSURE: 47 MMHG

## 2018-10-25 VITALS — SYSTOLIC BLOOD PRESSURE: 107 MMHG | DIASTOLIC BLOOD PRESSURE: 44 MMHG

## 2018-10-25 VITALS — SYSTOLIC BLOOD PRESSURE: 125 MMHG | DIASTOLIC BLOOD PRESSURE: 56 MMHG

## 2018-10-25 VITALS — SYSTOLIC BLOOD PRESSURE: 132 MMHG | DIASTOLIC BLOOD PRESSURE: 51 MMHG

## 2018-10-25 VITALS — SYSTOLIC BLOOD PRESSURE: 133 MMHG | DIASTOLIC BLOOD PRESSURE: 53 MMHG

## 2018-10-25 VITALS — SYSTOLIC BLOOD PRESSURE: 115 MMHG | DIASTOLIC BLOOD PRESSURE: 39 MMHG

## 2018-10-25 VITALS — DIASTOLIC BLOOD PRESSURE: 34 MMHG | SYSTOLIC BLOOD PRESSURE: 103 MMHG

## 2018-10-25 VITALS — DIASTOLIC BLOOD PRESSURE: 58 MMHG | SYSTOLIC BLOOD PRESSURE: 143 MMHG

## 2018-10-25 LAB
ALBUMIN SERPL-MCNC: 1.9 G/DL (ref 3.5–5)
ALBUMIN/GLOB SERPL: 0.6 {RATIO} (ref 0.8–2)
ALP SERPL-CCNC: 67 IU/L (ref 40–150)
ALT SERPL-CCNC: 121 IU/L (ref 0–55)
ANION GAP SERPL CALC-SCNC: 8 MMOL/L (ref 8–16)
BASOPHILS # BLD AUTO: 0.1 10*3/UL (ref 0–0.1)
BASOPHILS NFR BLD AUTO: 0.9 % (ref 0–1)
BUN SERPL-MCNC: 9 MG/DL (ref 7–26)
BUN/CREAT SERPL: 13 (ref 6–25)
CALCIUM SERPL-MCNC: 8.1 MG/DL (ref 8.4–10.2)
CHLORIDE SERPL-SCNC: 105 MMOL/L (ref 98–107)
CO2 SERPL-SCNC: 25 MMOL/L (ref 22–29)
DEPRECATED NEUTROPHILS # BLD AUTO: 11.7 10*3/UL (ref 2.1–6.9)
DEPRECATED PHOSPHATE SERPL-MCNC: 2.9 MG/DL (ref 2.3–4.7)
EGFRCR SERPLBLD CKD-EPI 2021: > 60 ML/MIN (ref 60–?)
EOSINOPHIL # BLD AUTO: 0 10*3/UL (ref 0–0.4)
EOSINOPHIL NFR BLD AUTO: 0.3 % (ref 0–6)
ERYTHROCYTE [DISTWIDTH] IN CORD BLOOD: 18.2 % (ref 11.7–14.4)
GLOBULIN PLAS-MCNC: 3.2 G/DL (ref 2.3–3.5)
GLUCOSE SERPLBLD-MCNC: 84 MG/DL (ref 74–118)
HCT VFR BLD AUTO: 24.8 % (ref 34.2–44.1)
HGB BLD-MCNC: 8 G/DL (ref 12–16)
LYMPHOCYTES # BLD: 1.7 10*3/UL (ref 1–3.2)
LYMPHOCYTES NFR BLD AUTO: 11 % (ref 18–39.1)
MAGNESIUM SERPL-MCNC: 2 MG/DL (ref 1.3–2.1)
MCH RBC QN AUTO: 27.1 PG (ref 28–32)
MCHC RBC AUTO-ENTMCNC: 32.3 G/DL (ref 31–35)
MCV RBC AUTO: 84.1 FL (ref 81–99)
MONOCYTES # BLD AUTO: 1.5 10*3/UL (ref 0.2–0.8)
MONOCYTES NFR BLD AUTO: 10.1 % (ref 4.4–11.3)
NEUTS SEG NFR BLD AUTO: 77.1 % (ref 38.7–80)
PLATELET # BLD AUTO: 362 X10E3/UL (ref 140–360)
POTASSIUM SERPL-SCNC: 4 MMOL/L (ref 3.5–5.1)
RBC # BLD AUTO: 2.95 X10E6/UL (ref 3.6–5.1)
SODIUM SERPL-SCNC: 134 MMOL/L (ref 136–145)

## 2018-10-25 RX ADMIN — TAZOBACTAM SODIUM AND PIPERACILLIN SODIUM SCH MLS/HR: 375; 3 INJECTION, SOLUTION INTRAVENOUS at 05:10

## 2018-10-25 RX ADMIN — SODIUM CHLORIDE SCH MG: 900 INJECTION INTRAVENOUS at 05:10

## 2018-10-25 RX ADMIN — INSULIN HUMAN SCH UNIT: 100 INJECTION, SOLUTION PARENTERAL at 12:00

## 2018-10-25 RX ADMIN — HYDROMORPHONE HYDROCHLORIDE PRN MG: 2 INJECTION INTRAMUSCULAR; INTRAVENOUS; SUBCUTANEOUS at 06:50

## 2018-10-25 RX ADMIN — TAZOBACTAM SODIUM AND PIPERACILLIN SODIUM SCH MLS/HR: 375; 3 INJECTION, SOLUTION INTRAVENOUS at 12:10

## 2018-10-25 RX ADMIN — METOPROLOL TARTRATE SCH MG: 1 INJECTION, SOLUTION INTRAVENOUS at 09:06

## 2018-10-25 RX ADMIN — HYDROMORPHONE HYDROCHLORIDE PRN MG: 2 INJECTION INTRAMUSCULAR; INTRAVENOUS; SUBCUTANEOUS at 02:12

## 2018-10-25 RX ADMIN — SODIUM CHLORIDE SCH ML: 900 IRRIGANT IRRIGATION at 13:49

## 2018-10-25 RX ADMIN — SODIUM CHLORIDE SCH ML: 900 IRRIGANT IRRIGATION at 22:40

## 2018-10-25 RX ADMIN — INSULIN HUMAN SCH UNIT: 100 INJECTION, SOLUTION PARENTERAL at 18:00

## 2018-10-25 RX ADMIN — HYDROMORPHONE HYDROCHLORIDE PRN MG: 2 INJECTION INTRAMUSCULAR; INTRAVENOUS; SUBCUTANEOUS at 10:33

## 2018-10-25 RX ADMIN — SODIUM CHLORIDE SCH ML: 900 IRRIGANT IRRIGATION at 18:26

## 2018-10-25 RX ADMIN — HYDROMORPHONE HYDROCHLORIDE PRN MG: 2 INJECTION INTRAMUSCULAR; INTRAVENOUS; SUBCUTANEOUS at 14:00

## 2018-10-25 RX ADMIN — SODIUM CHLORIDE SCH MLS/HR: 9 INJECTION, SOLUTION INTRAVENOUS at 06:00

## 2018-10-25 RX ADMIN — BISACODYL SCH MG: 10 SUPPOSITORY RECTAL at 22:00

## 2018-10-25 RX ADMIN — METOPROLOL TARTRATE SCH MG: 1 INJECTION, SOLUTION INTRAVENOUS at 22:00

## 2018-10-25 RX ADMIN — SODIUM CHLORIDE SCH ML: 900 IRRIGANT IRRIGATION at 06:00

## 2018-10-25 RX ADMIN — METRONIDAZOLE SCH MLS/HR: 500 INJECTION, SOLUTION INTRAVENOUS at 12:30

## 2018-10-25 RX ADMIN — SODIUM CHLORIDE SCH ML: 900 IRRIGANT IRRIGATION at 08:59

## 2018-10-25 RX ADMIN — METRONIDAZOLE SCH MLS/HR: 500 INJECTION, SOLUTION INTRAVENOUS at 06:30

## 2018-10-25 RX ADMIN — METRONIDAZOLE SCH MLS/HR: 500 INJECTION, SOLUTION INTRAVENOUS at 18:30

## 2018-10-25 RX ADMIN — INSULIN HUMAN SCH UNIT: 100 INJECTION, SOLUTION PARENTERAL at 00:00

## 2018-10-25 RX ADMIN — TAZOBACTAM SODIUM AND PIPERACILLIN SODIUM SCH MLS/HR: 375; 3 INJECTION, SOLUTION INTRAVENOUS at 19:20

## 2018-10-25 RX ADMIN — SODIUM CHLORIDE SCH ML: 900 IRRIGANT IRRIGATION at 02:12

## 2018-10-25 RX ADMIN — HYDROMORPHONE HYDROCHLORIDE PRN MG: 2 INJECTION INTRAMUSCULAR; INTRAVENOUS; SUBCUTANEOUS at 19:50

## 2018-10-25 RX ADMIN — SODIUM CHLORIDE SCH MLS/HR: 9 INJECTION, SOLUTION INTRAVENOUS at 13:43

## 2018-10-25 RX ADMIN — SODIUM CHLORIDE SCH MLS/HR: 9 INJECTION, SOLUTION INTRAVENOUS at 14:10

## 2018-10-25 RX ADMIN — INSULIN HUMAN SCH UNIT: 100 INJECTION, SOLUTION PARENTERAL at 06:00

## 2018-10-25 NOTE — PROGRESS NOTE
DATE:    



Physician cancelled.









DD:  10/25/2018 02:53

DT:  10/25/2018 03:15

Job#:  [id number]

## 2018-10-25 NOTE — DIAGNOSTIC IMAGING REPORT
ABDOMEN-1VIEW (KUB)



Clinical history: Verifying G-tube placement

Technique: AP view abdomen

Comparison: CT from 10/23/2018



Findings:

Hemidiaphragms are partially excluded. Overlying skin staples related to recent

surgery with presumed postsurgical pneumoperitoneum poorly assessed on supine

view. Paucity of bowel gas without evidence of obstruction. Large calcified

pelvic fibroid.



Impression:

NG tube tip overlies the gastric antrum. 



Signed by: Dr Tamika Ragland MD on 10/25/2018 10:30 PM

## 2018-10-25 NOTE — HISTORY AND PHYSICAL
PRIMARY CARE DOCTOR:  Dr. Sharpe from [facility].



This is coverage for Dr. Jose Angel Garibay.



HISTORY:  Ms. Ho is a pleasant 63-year-old female with abdominal 

pain.  She is having a lot of nausea.  The patient came for CT of chest 

emergently and saw severe atherosclerosis of celiac and superior mesenteric 

artery.  The patient has been having chronic weight loss of almost 70 

pounds in the last few months, and she has had chronic diarrhea.  She was 

diagnosed with Crohn versus irritable bowel syndrome by her GI doctor, 

Manoj Sanchez.  Given the acute findings which are suggestive largely of 

ischemic colitis, the patient had emergency surgery.  She had partial 

resection of small bowel and cholecystectomy.  



Currently, the patient is on 2 L per minute of oxygen and spontaneously 

breathing.  NG tube in place and on intermittent/continuous wall suction. 



PAST MEDICAL HISTORY:  Hypertension, diabetes, hyperlipidemia, peptic ulcer 

disease, Crohn disease versus irritable bowel syndrome.  



MEDICATIONS:  List reviewed per electronic record.  Currently, on Zosyn and 

Flagyl antibiotics.



ALLERGIES:  ATORVASTATIN ALLERGY.



SOCIAL HISTORY:  Tobacco at age 18 to 63, one pack per day.  No alcohol.  

No drugs.  



FAMILY HISTORY:  Noncontributory.



REVIEW OF SYSTEMS:  Difficult to get as she has throat pain and NG tube in 

place.



PHYSICAL EXAMINATION 

VITALS:  Currently afebrile.  Vital signs noted.  Stable per electronic 

record.

GENERAL:  In no acute distress.  Alert and slightly tired in bed.

HEENT:  Normocephalic and atraumatic.  

NECK:  Supple.  Throat midline.

LUNGS:  Bilateral air entry.  Clear.

CARDIOVASCULAR:  S1 and S2.  No murmurs, rubs or gallops.

ABDOMEN:  Soft and nontender.

EXTREMITIES:  No clubbing.  No cyanosis.  There is no edema. 

INTEGUMENT:  No rash.  No purpura.  



LABS:  Potassium 2.8, bicarbonate 24, BUN 8, creatinine 0.6.  White count 

21, hematocrit 27 and platelets 416,000.  INR 1.2.  



IMPRESSION AND PLAN 

1.  Ischemic colitis.

2.  Postoperative state:  Status post partial small-bowel resection and 

cholecystectomy.

3.  Peripheral vascular disease, including atherosclerosis of celiac and 

superior mesenteric artery per computerized tomography. 

4.  Chronic obstructive pulmonary disease:  Seems very likely. 

5.  Hypertension. 

6.  Diabetes.

7.  Hyperlipidemia.

8.  History of peptic ulcer disease.

9.  Crohn disease versus irritable bowel disease.

10. Active smoker.

11. Hypokalemia and hyponatremia.



Give aggressive potassium and magnesium.  Recheck blood work in the 

morning.  The patient will continue on supplemental oxygen.  She is on NG 

tube suctioning per surgery.  Smoking cessation is highly recommended.  For 

now, continue other supportive treatment.  The patient can get vitamins 1 

dose given her significant weight loss recently.  





Thank you very much, Dr. Sharpe, for allowing me the chance to participate 

in the care of Mrs. Ho.  Do not hesitate to contact me if I can help 

in any way.  









DD:  10/25/2018 02:59

DT:  10/25/2018 03:24

Job#:  [id number]

## 2018-10-26 VITALS — DIASTOLIC BLOOD PRESSURE: 62 MMHG | SYSTOLIC BLOOD PRESSURE: 136 MMHG

## 2018-10-26 VITALS — DIASTOLIC BLOOD PRESSURE: 50 MMHG | SYSTOLIC BLOOD PRESSURE: 104 MMHG

## 2018-10-26 VITALS — DIASTOLIC BLOOD PRESSURE: 67 MMHG | SYSTOLIC BLOOD PRESSURE: 169 MMHG

## 2018-10-26 VITALS — DIASTOLIC BLOOD PRESSURE: 70 MMHG | SYSTOLIC BLOOD PRESSURE: 163 MMHG

## 2018-10-26 VITALS — SYSTOLIC BLOOD PRESSURE: 111 MMHG | DIASTOLIC BLOOD PRESSURE: 54 MMHG

## 2018-10-26 VITALS — SYSTOLIC BLOOD PRESSURE: 110 MMHG | DIASTOLIC BLOOD PRESSURE: 52 MMHG

## 2018-10-26 LAB
ALBUMIN SERPL-MCNC: 1.8 G/DL (ref 3.5–5)
ALBUMIN/GLOB SERPL: 0.5 {RATIO} (ref 0.8–2)
ALP SERPL-CCNC: 65 IU/L (ref 40–150)
ALT SERPL-CCNC: 116 IU/L (ref 0–55)
ANION GAP SERPL CALC-SCNC: 11.6 MMOL/L (ref 8–16)
ANISOCYTOSIS BLD QL SMEAR: SLIGHT
BASOPHILS # BLD AUTO: 0.1 10*3/UL (ref 0–0.1)
BASOPHILS NFR BLD AUTO: 0.8 % (ref 0–1)
BUN SERPL-MCNC: 8 MG/DL (ref 7–26)
BUN/CREAT SERPL: 12 (ref 6–25)
CALCIUM SERPL-MCNC: 8.1 MG/DL (ref 8.4–10.2)
CHLORIDE SERPL-SCNC: 113 MMOL/L (ref 98–107)
CO2 SERPL-SCNC: 22 MMOL/L (ref 22–29)
DEPRECATED NEUTROPHILS # BLD AUTO: 11.1 10*3/UL (ref 2.1–6.9)
EGFRCR SERPLBLD CKD-EPI 2021: > 60 ML/MIN (ref 60–?)
EOSINOPHIL # BLD AUTO: 0 10*3/UL (ref 0–0.4)
EOSINOPHIL NFR BLD AUTO: 0.2 % (ref 0–6)
ERYTHROCYTE [DISTWIDTH] IN CORD BLOOD: 18.5 % (ref 11.7–14.4)
GLOBULIN PLAS-MCNC: 3.3 G/DL (ref 2.3–3.5)
GLUCOSE SERPLBLD-MCNC: 67 MG/DL (ref 74–118)
HCT VFR BLD AUTO: 22.3 % (ref 34.2–44.1)
HGB BLD-MCNC: 7.2 G/DL (ref 12–16)
HYPOCHROMIA BLD QL SMEAR: (no result)
LYMPHOCYTES # BLD: 1.4 10*3/UL (ref 1–3.2)
LYMPHOCYTES NFR BLD AUTO: 9.6 % (ref 18–39.1)
LYMPHOCYTES NFR BLD MANUAL: 3 % (ref 19–48)
MCH RBC QN AUTO: 27.4 PG (ref 28–32)
MCHC RBC AUTO-ENTMCNC: 32.3 G/DL (ref 31–35)
MCV RBC AUTO: 84.8 FL (ref 81–99)
MONOCYTES # BLD AUTO: 1.8 10*3/UL (ref 0.2–0.8)
MONOCYTES NFR BLD AUTO: 12.3 % (ref 4.4–11.3)
MONOCYTES NFR BLD MANUAL: 8 % (ref 3.4–9)
NEUTS SEG NFR BLD AUTO: 76.5 % (ref 38.7–80)
NEUTS SEG NFR BLD MANUAL: 88 % (ref 40–74)
PLAT MORPH BLD: (no result)
PLATELET # BLD AUTO: 331 X10E3/UL (ref 140–360)
PLATELET # BLD EST: ADEQUATE 10*3/UL
POTASSIUM SERPL-SCNC: 3.6 MMOL/L (ref 3.5–5.1)
RBC # BLD AUTO: 2.63 X10E6/UL (ref 3.6–5.1)
RBC MORPH BLD: NORMAL
SODIUM SERPL-SCNC: 143 MMOL/L (ref 136–145)

## 2018-10-26 RX ADMIN — FUROSEMIDE PRN MG: 10 INJECTION, SOLUTION INTRAMUSCULAR; INTRAVENOUS at 18:30

## 2018-10-26 RX ADMIN — METRONIDAZOLE SCH MLS/HR: 500 INJECTION, SOLUTION INTRAVENOUS at 05:30

## 2018-10-26 RX ADMIN — INSULIN HUMAN SCH UNIT: 100 INJECTION, SOLUTION PARENTERAL at 12:00

## 2018-10-26 RX ADMIN — HYDROMORPHONE HYDROCHLORIDE PRN MG: 2 INJECTION INTRAMUSCULAR; INTRAVENOUS; SUBCUTANEOUS at 03:01

## 2018-10-26 RX ADMIN — TAZOBACTAM SODIUM AND PIPERACILLIN SODIUM SCH MLS/HR: 375; 3 INJECTION, SOLUTION INTRAVENOUS at 18:44

## 2018-10-26 RX ADMIN — INSULIN HUMAN SCH UNIT: 100 INJECTION, SOLUTION PARENTERAL at 06:00

## 2018-10-26 RX ADMIN — METRONIDAZOLE SCH MLS/HR: 500 INJECTION, SOLUTION INTRAVENOUS at 00:00

## 2018-10-26 RX ADMIN — SODIUM CHLORIDE SCH ML: 900 IRRIGANT IRRIGATION at 01:49

## 2018-10-26 RX ADMIN — SODIUM CHLORIDE SCH ML: 900 IRRIGANT IRRIGATION at 09:45

## 2018-10-26 RX ADMIN — METRONIDAZOLE SCH MLS/HR: 500 INJECTION, SOLUTION INTRAVENOUS at 12:40

## 2018-10-26 RX ADMIN — SODIUM CHLORIDE SCH ML: 900 IRRIGANT IRRIGATION at 13:45

## 2018-10-26 RX ADMIN — SODIUM CHLORIDE SCH ML: 900 IRRIGANT IRRIGATION at 05:33

## 2018-10-26 RX ADMIN — METRONIDAZOLE SCH MLS/HR: 500 INJECTION, SOLUTION INTRAVENOUS at 20:40

## 2018-10-26 RX ADMIN — TAZOBACTAM SODIUM AND PIPERACILLIN SODIUM SCH MLS/HR: 375; 3 INJECTION, SOLUTION INTRAVENOUS at 01:00

## 2018-10-26 RX ADMIN — INSULIN HUMAN SCH UNIT: 100 INJECTION, SOLUTION PARENTERAL at 00:00

## 2018-10-26 RX ADMIN — METOPROLOL TARTRATE SCH MG: 1 INJECTION, SOLUTION INTRAVENOUS at 10:50

## 2018-10-26 RX ADMIN — METOPROLOL TARTRATE SCH MG: 1 INJECTION, SOLUTION INTRAVENOUS at 22:03

## 2018-10-26 RX ADMIN — HYDROMORPHONE HYDROCHLORIDE PRN MG: 2 INJECTION INTRAMUSCULAR; INTRAVENOUS; SUBCUTANEOUS at 20:41

## 2018-10-26 RX ADMIN — SODIUM CHLORIDE SCH MLS/HR: 9 INJECTION, SOLUTION INTRAVENOUS at 10:30

## 2018-10-26 RX ADMIN — TAZOBACTAM SODIUM AND PIPERACILLIN SODIUM SCH MLS/HR: 375; 3 INJECTION, SOLUTION INTRAVENOUS at 12:21

## 2018-10-26 RX ADMIN — SODIUM CHLORIDE SCH ML: 900 IRRIGANT IRRIGATION at 17:45

## 2018-10-26 RX ADMIN — INSULIN HUMAN SCH UNIT: 100 INJECTION, SOLUTION PARENTERAL at 18:00

## 2018-10-26 RX ADMIN — SODIUM CHLORIDE SCH MG: 900 INJECTION INTRAVENOUS at 05:51

## 2018-10-26 RX ADMIN — SODIUM CHLORIDE PRN MG: 900 INJECTION INTRAVENOUS at 20:41

## 2018-10-26 RX ADMIN — BISACODYL SCH MG: 10 SUPPOSITORY RECTAL at 08:00

## 2018-10-26 RX ADMIN — TAZOBACTAM SODIUM AND PIPERACILLIN SODIUM SCH MLS/HR: 375; 3 INJECTION, SOLUTION INTRAVENOUS at 06:30

## 2018-10-26 NOTE — PROGRESS NOTE
DATE:  October 25, 2018 



INTERNAL MEDICINE PROGRESS NOTE



Coverage for Dr. Garibay.



SUBJECTIVE:  Ms. Ho was seen and examined at bedside.  She continues 

to have NG tube in place on continuous low suction.  She is sitting up 

today.  Normal saline at 125 mL per hour.  Gaxiola in place with good urine 

output.



REVIEW OF SYSTEMS:  No headaches, no nosebleeds.



OBJECTIVE:

VITAL SIGNS:  Afebrile, vital signs noted per electronic record.

GENERAL:  Looks pale, but no acute distress.

HEENT:  Normocephalic, atraumatic.

NECK:  Supple.  Throat midline.

LUNGS:  Bilateral air entry, decreased breath sounds, rare rhonchi.

CARDIOVASCULAR:  S1, S2.  No murmurs, rubs, or gallops.

ABDOMEN:  Soft, nontender.

EXTREMITIES:  No clubbing, no cyanosis, there is no edema.

INTEGUMENT:  No rash or purpura.



LABS:  4.0 potassium, 9 BUN, 0.7 creatinine.  15 white count, 25 

hematocrit, 362,000 platelets.



IMPRESSIONS AND PLAN:

1. Postoperative state, status post partial small-bowel resection and 

cholecystectomy.

2. Ischemic colitis on admission.

3. Peripheral vascular disease including celiac and superior mesenteric 

artery atherosclerotic disease.

4. Chronic obstructive pulmonary disease likely, without active flare.

5. Hypertension.

6. Diabetes.

7. Hyperlipidemia.

8. Crohn's disease versus irritable bowel disease.

9. Active smoker.



Nasogastric tube to remain in place at this time.  Continue n.p.o. state.  

Continue intravenous fluids and continue to mobilize her as possible.  

Follow up closely.







DD:  10/26/2018 02:00

DT:  10/26/2018 02:43

Job#:  [id number]

## 2018-10-27 VITALS — DIASTOLIC BLOOD PRESSURE: 70 MMHG | SYSTOLIC BLOOD PRESSURE: 160 MMHG

## 2018-10-27 VITALS — SYSTOLIC BLOOD PRESSURE: 190 MMHG | DIASTOLIC BLOOD PRESSURE: 76 MMHG

## 2018-10-27 VITALS — DIASTOLIC BLOOD PRESSURE: 64 MMHG | SYSTOLIC BLOOD PRESSURE: 139 MMHG

## 2018-10-27 VITALS — SYSTOLIC BLOOD PRESSURE: 162 MMHG | DIASTOLIC BLOOD PRESSURE: 71 MMHG

## 2018-10-27 VITALS — SYSTOLIC BLOOD PRESSURE: 126 MMHG | DIASTOLIC BLOOD PRESSURE: 61 MMHG

## 2018-10-27 VITALS — SYSTOLIC BLOOD PRESSURE: 138 MMHG | DIASTOLIC BLOOD PRESSURE: 62 MMHG

## 2018-10-27 VITALS — DIASTOLIC BLOOD PRESSURE: 61 MMHG | SYSTOLIC BLOOD PRESSURE: 126 MMHG

## 2018-10-27 VITALS — SYSTOLIC BLOOD PRESSURE: 103 MMHG | DIASTOLIC BLOOD PRESSURE: 49 MMHG

## 2018-10-27 LAB
ANION GAP SERPL CALC-SCNC: 19 MMOL/L (ref 8–16)
BASOPHILS # BLD AUTO: 0.1 10*3/UL (ref 0–0.1)
BASOPHILS NFR BLD AUTO: 0.6 % (ref 0–1)
BUN SERPL-MCNC: 7 MG/DL (ref 7–26)
BUN/CREAT SERPL: 10 (ref 6–25)
CALCIUM SERPL-MCNC: 8.7 MG/DL (ref 8.4–10.2)
CHLORIDE SERPL-SCNC: 109 MMOL/L (ref 98–107)
CO2 SERPL-SCNC: 20 MMOL/L (ref 22–29)
DEPRECATED NEUTROPHILS # BLD AUTO: 11.5 10*3/UL (ref 2.1–6.9)
EGFRCR SERPLBLD CKD-EPI 2021: > 60 ML/MIN (ref 60–?)
EOSINOPHIL # BLD AUTO: 0 10*3/UL (ref 0–0.4)
EOSINOPHIL NFR BLD AUTO: 0.3 % (ref 0–6)
ERYTHROCYTE [DISTWIDTH] IN CORD BLOOD: 17.3 % (ref 11.7–14.4)
GLUCOSE SERPLBLD-MCNC: 58 MG/DL (ref 74–118)
HCT VFR BLD AUTO: 29.2 % (ref 34.2–44.1)
HGB BLD-MCNC: 9.5 G/DL (ref 12–16)
LYMPHOCYTES # BLD: 1.4 10*3/UL (ref 1–3.2)
LYMPHOCYTES NFR BLD AUTO: 9.4 % (ref 18–39.1)
LYMPHOCYTES NFR BLD MANUAL: 9 % (ref 19–48)
MAGNESIUM SERPL-MCNC: 1.2 MG/DL (ref 1.3–2.1)
MCH RBC QN AUTO: 27.5 PG (ref 28–32)
MCHC RBC AUTO-ENTMCNC: 32.5 G/DL (ref 31–35)
MCV RBC AUTO: 84.6 FL (ref 81–99)
MONOCYTES # BLD AUTO: 1.6 10*3/UL (ref 0.2–0.8)
MONOCYTES NFR BLD AUTO: 10.7 % (ref 4.4–11.3)
MONOCYTES NFR BLD MANUAL: 8 % (ref 3.4–9)
NEUTS SEG NFR BLD AUTO: 78.3 % (ref 38.7–80)
NEUTS SEG NFR BLD MANUAL: 83 % (ref 40–74)
PLAT MORPH BLD: NORMAL
PLATELET # BLD AUTO: 352 X10E3/UL (ref 140–360)
PLATELET # BLD EST: ADEQUATE 10*3/UL
POTASSIUM SERPL-SCNC: 3 MMOL/L (ref 3.5–5.1)
RBC # BLD AUTO: 3.45 X10E6/UL (ref 3.6–5.1)
RBC MORPH BLD: NORMAL
SODIUM SERPL-SCNC: 145 MMOL/L (ref 136–145)

## 2018-10-27 RX ADMIN — INSULIN HUMAN SCH UNIT: 100 INJECTION, SOLUTION PARENTERAL at 18:00

## 2018-10-27 RX ADMIN — INSULIN HUMAN SCH UNIT: 100 INJECTION, SOLUTION PARENTERAL at 12:00

## 2018-10-27 RX ADMIN — INSULIN HUMAN SCH UNIT: 100 INJECTION, SOLUTION PARENTERAL at 06:00

## 2018-10-27 RX ADMIN — TAZOBACTAM SODIUM AND PIPERACILLIN SODIUM SCH MLS/HR: 375; 3 INJECTION, SOLUTION INTRAVENOUS at 18:00

## 2018-10-27 RX ADMIN — METRONIDAZOLE SCH MLS/HR: 500 INJECTION, SOLUTION INTRAVENOUS at 06:54

## 2018-10-27 RX ADMIN — METRONIDAZOLE SCH MLS/HR: 500 INJECTION, SOLUTION INTRAVENOUS at 13:30

## 2018-10-27 RX ADMIN — METRONIDAZOLE SCH MLS/HR: 500 INJECTION, SOLUTION INTRAVENOUS at 01:51

## 2018-10-27 RX ADMIN — FUROSEMIDE PRN MG: 10 INJECTION, SOLUTION INTRAMUSCULAR; INTRAVENOUS at 02:16

## 2018-10-27 RX ADMIN — TAZOBACTAM SODIUM AND PIPERACILLIN SODIUM SCH MLS/HR: 375; 3 INJECTION, SOLUTION INTRAVENOUS at 12:50

## 2018-10-27 RX ADMIN — INSULIN HUMAN SCH UNIT: 100 INJECTION, SOLUTION PARENTERAL at 00:00

## 2018-10-27 RX ADMIN — HYDROMORPHONE HYDROCHLORIDE PRN MG: 2 INJECTION INTRAMUSCULAR; INTRAVENOUS; SUBCUTANEOUS at 03:49

## 2018-10-27 RX ADMIN — SODIUM CHLORIDE PRN MG: 900 INJECTION INTRAVENOUS at 10:04

## 2018-10-27 RX ADMIN — TAZOBACTAM SODIUM AND PIPERACILLIN SODIUM SCH MLS/HR: 375; 3 INJECTION, SOLUTION INTRAVENOUS at 00:25

## 2018-10-27 RX ADMIN — HYDROCODONE BITARTRATE AND ACETAMINOPHEN PRN EA: 7.5; 325 TABLET ORAL at 17:40

## 2018-10-27 RX ADMIN — BISACODYL SCH MG: 10 SUPPOSITORY RECTAL at 21:14

## 2018-10-27 RX ADMIN — DEXTROSE AND SODIUM CHLORIDE SCH MLS/HR: 5; 900 INJECTION, SOLUTION INTRAVENOUS at 07:30

## 2018-10-27 RX ADMIN — METOPROLOL TARTRATE SCH MG: 1 INJECTION, SOLUTION INTRAVENOUS at 10:04

## 2018-10-27 RX ADMIN — DEXTROSE AND SODIUM CHLORIDE SCH MLS/HR: 5; 900 INJECTION, SOLUTION INTRAVENOUS at 21:14

## 2018-10-27 RX ADMIN — SODIUM CHLORIDE SCH MG: 900 INJECTION INTRAVENOUS at 06:16

## 2018-10-27 RX ADMIN — METRONIDAZOLE SCH MLS/HR: 500 INJECTION, SOLUTION INTRAVENOUS at 17:30

## 2018-10-27 RX ADMIN — TAZOBACTAM SODIUM AND PIPERACILLIN SODIUM SCH MLS/HR: 375; 3 INJECTION, SOLUTION INTRAVENOUS at 06:16

## 2018-10-27 RX ADMIN — HYDROMORPHONE HYDROCHLORIDE PRN MG: 2 INJECTION INTRAMUSCULAR; INTRAVENOUS; SUBCUTANEOUS at 10:04

## 2018-10-27 RX ADMIN — METOPROLOL TARTRATE SCH MG: 1 INJECTION, SOLUTION INTRAVENOUS at 21:14

## 2018-10-27 RX ADMIN — HYDROMORPHONE HYDROCHLORIDE PRN MG: 2 INJECTION INTRAMUSCULAR; INTRAVENOUS; SUBCUTANEOUS at 18:45

## 2018-10-27 NOTE — PROGRESS NOTE
DATE:  October 27, 2018 



SUBJECTIVE:  The patient was able to ambulate today.  She is able to pass 

small amounts of air.  She has not had any vomiting.  She still has some 

abdominal pain that is relieved with pain medication.



OBJECTIVE

VITAL SIGNS:  Stable.

HEENT:  Shows no facial swelling or erythema.  The nasal mucosa is normal.  

The oropharynx is normal.

LYMPHATIC:  Shows no submandibular, cervical, or supraclavicular 

adenopathy.

CARDIAC:  Reveals a regular rate and rhythm with a normal S1 and S2.  There 

are no murmurs or rubs.

LUNGS:  Auscultation of the lungs shows no clear breath sounds bilaterally. 

 There is no wheezing.

ABDOMEN:  Soft.  There is bandage.  There is no rebound or guarding.

EXTREMITIES:  There is no leg edema.  There are SCDs in place.



IMPRESSION

1. Ischemic colitis, requiring partial colectomy.

2. History of Crohn disease.

3. Chronic obstructive pulmonary disease of unclear severity.

4. Anemia.

5. Hypomagnesemia.



PLAN

1. Replace magnesium.

2. Continue ambulation.

3. Pain control.

4. Dressing changes as per surgery.

5. Discuss disposition with general surgery.









DD:  10/27/2018 12:58

DT:  10/27/2018 13:10

Job#:  [id number]

## 2018-10-27 NOTE — PROGRESS NOTE
DATE:  October 26, 2018 



INTERNAL MEDICINE PROGRESS NOTE 



Coverage for Dr. Jose Angel Garibay. 



SUBJECTIVE:  Ms. Ho was seen and examined at bedside.  No nausea and 

no vomiting today.  No flatus, however.  She did however record a bowel 

movement.  She was able to walk today.  NG tube in place. 



REVIEW OF SYSTEMS:  No bleeding and no headaches.



OBJECTIVE 

VITAL SIGNS:  Afebrile, vital signs noted per electronic record. 

GENERAL:  In no acute distress, alert and calm. 

HEENT:  Normocephalic, atraumatic. 

NECK:  Supple.  Throat midline. 

LUNGS:  Bilateral air entry, mildly decreased breath sounds, clear. 

CARDIOVASCULAR:  S1 and S2.  No murmurs, rubs, or gallops. 

ABDOMEN:  Soft and nontender. 

EXTREMITIES:  No clubbing and no cyanosis.  There is no edema.

INTEGUMENT:  No rash and no purpura. 



LABS:  Potassium 3.6 and 0.7 creatinine.  White count 15 and 22 hematocrit. 





IMPRESSION AND PLAN 

1. Ischemic colitis, emergent. 

2. Postoperative state, status post partial small bowel resection and 

cholecystectomy. 

3. Peripheral vascular disease, celiac/superior mesenteric arteries per 

CT scan. 

4. Chronic obstructive pulmonary disease _____. 

5. History of Crohn's versus irritable bowel syndrome. 

6. Active smoker. 

7. Anemia. 



NG tube.  Continue to give ice chips for comfort.  Decide on diet by 

tomorrow noting the bowel movement.  NG tube will be planned and give the 

packed red blood cells transfusions.  Repeat labs tomorrow.  Continue to 

ambulate the patient. 









DD:  10/27/2018 03:22

DT:  10/27/2018 03:56

Job#:  [id number] [id number]

## 2018-10-28 VITALS — SYSTOLIC BLOOD PRESSURE: 139 MMHG | DIASTOLIC BLOOD PRESSURE: 62 MMHG

## 2018-10-28 VITALS — DIASTOLIC BLOOD PRESSURE: 67 MMHG | SYSTOLIC BLOOD PRESSURE: 151 MMHG

## 2018-10-28 VITALS — DIASTOLIC BLOOD PRESSURE: 80 MMHG | SYSTOLIC BLOOD PRESSURE: 182 MMHG

## 2018-10-28 VITALS — DIASTOLIC BLOOD PRESSURE: 69 MMHG | SYSTOLIC BLOOD PRESSURE: 142 MMHG

## 2018-10-28 VITALS — SYSTOLIC BLOOD PRESSURE: 157 MMHG | DIASTOLIC BLOOD PRESSURE: 72 MMHG

## 2018-10-28 VITALS — DIASTOLIC BLOOD PRESSURE: 60 MMHG | SYSTOLIC BLOOD PRESSURE: 135 MMHG

## 2018-10-28 LAB
ALBUMIN SERPL-MCNC: 2 G/DL (ref 3.5–5)
ALBUMIN/GLOB SERPL: 0.6 {RATIO} (ref 0.8–2)
ALP SERPL-CCNC: 69 IU/L (ref 40–150)
ALT SERPL-CCNC: 57 IU/L (ref 0–55)
ANION GAP SERPL CALC-SCNC: 11.5 MMOL/L (ref 8–16)
BASOPHILS # BLD AUTO: 0.1 10*3/UL (ref 0–0.1)
BASOPHILS NFR BLD AUTO: 1 % (ref 0–1)
BUN SERPL-MCNC: < 5 MG/DL (ref 7–26)
BUN/CREAT SERPL: 7 (ref 6–25)
CALCIUM SERPL-MCNC: 8.4 MG/DL (ref 8.4–10.2)
CHLORIDE SERPL-SCNC: 111 MMOL/L (ref 98–107)
CO2 SERPL-SCNC: 25 MMOL/L (ref 22–29)
DEPRECATED NEUTROPHILS # BLD AUTO: 7.5 10*3/UL (ref 2.1–6.9)
EGFRCR SERPLBLD CKD-EPI 2021: > 60 ML/MIN (ref 60–?)
EOSINOPHIL # BLD AUTO: 0.6 10*3/UL (ref 0–0.4)
EOSINOPHIL NFR BLD AUTO: 4.9 % (ref 0–6)
ERYTHROCYTE [DISTWIDTH] IN CORD BLOOD: 17.5 % (ref 11.7–14.4)
GLOBULIN PLAS-MCNC: 3.4 G/DL (ref 2.3–3.5)
GLUCOSE SERPLBLD-MCNC: 128 MG/DL (ref 74–118)
HCT VFR BLD AUTO: 30.6 % (ref 34.2–44.1)
HGB BLD-MCNC: 10 G/DL (ref 12–16)
LYMPHOCYTES # BLD: 1.9 10*3/UL (ref 1–3.2)
LYMPHOCYTES NFR BLD AUTO: 16.7 % (ref 18–39.1)
MCH RBC QN AUTO: 27.5 PG (ref 28–32)
MCHC RBC AUTO-ENTMCNC: 32.7 G/DL (ref 31–35)
MCV RBC AUTO: 84.1 FL (ref 81–99)
MONOCYTES # BLD AUTO: 1.3 10*3/UL (ref 0.2–0.8)
MONOCYTES NFR BLD AUTO: 11.6 % (ref 4.4–11.3)
NEUTS SEG NFR BLD AUTO: 65.3 % (ref 38.7–80)
PLATELET # BLD AUTO: 371 X10E3/UL (ref 140–360)
POTASSIUM SERPL-SCNC: 2.5 MMOL/L (ref 3.5–5.1)
RBC # BLD AUTO: 3.64 X10E6/UL (ref 3.6–5.1)
SODIUM SERPL-SCNC: 145 MMOL/L (ref 136–145)

## 2018-10-28 RX ADMIN — POTASSIUM CHLORIDE SCH MLS/HR: 14.9 INJECTION, SOLUTION INTRAVENOUS at 17:13

## 2018-10-28 RX ADMIN — HYDROCODONE BITARTRATE AND ACETAMINOPHEN PRN EA: 7.5; 325 TABLET ORAL at 05:57

## 2018-10-28 RX ADMIN — POTASSIUM CHLORIDE SCH MLS/HR: 14.9 INJECTION, SOLUTION INTRAVENOUS at 15:00

## 2018-10-28 RX ADMIN — INSULIN HUMAN SCH UNIT: 100 INJECTION, SOLUTION PARENTERAL at 16:30

## 2018-10-28 RX ADMIN — SODIUM CHLORIDE SCH MG: 900 INJECTION INTRAVENOUS at 05:57

## 2018-10-28 RX ADMIN — HYDROCODONE BITARTRATE AND ACETAMINOPHEN PRN EA: 7.5; 325 TABLET ORAL at 17:52

## 2018-10-28 RX ADMIN — METRONIDAZOLE SCH MLS/HR: 500 INJECTION, SOLUTION INTRAVENOUS at 00:43

## 2018-10-28 RX ADMIN — DEXTROSE AND SODIUM CHLORIDE SCH MLS/HR: 5; 900 INJECTION, SOLUTION INTRAVENOUS at 21:30

## 2018-10-28 RX ADMIN — METRONIDAZOLE SCH MLS/HR: 500 INJECTION, SOLUTION INTRAVENOUS at 17:49

## 2018-10-28 RX ADMIN — DEXTROSE AND SODIUM CHLORIDE SCH MLS/HR: 5; 900 INJECTION, SOLUTION INTRAVENOUS at 13:52

## 2018-10-28 RX ADMIN — POTASSIUM CHLORIDE PRN MLS/HR: 14.9 INJECTION, SOLUTION INTRAVENOUS at 09:00

## 2018-10-28 RX ADMIN — METRONIDAZOLE SCH MLS/HR: 500 INJECTION, SOLUTION INTRAVENOUS at 06:35

## 2018-10-28 RX ADMIN — HYDROMORPHONE HYDROCHLORIDE PRN MG: 2 INJECTION INTRAMUSCULAR; INTRAVENOUS; SUBCUTANEOUS at 20:11

## 2018-10-28 RX ADMIN — METRONIDAZOLE SCH MLS/HR: 500 INJECTION, SOLUTION INTRAVENOUS at 12:40

## 2018-10-28 RX ADMIN — BISACODYL SCH MG: 10 SUPPOSITORY RECTAL at 08:00

## 2018-10-28 RX ADMIN — POTASSIUM CHLORIDE PRN MLS/HR: 14.9 INJECTION, SOLUTION INTRAVENOUS at 07:03

## 2018-10-28 RX ADMIN — TAZOBACTAM SODIUM AND PIPERACILLIN SODIUM SCH MLS/HR: 375; 3 INJECTION, SOLUTION INTRAVENOUS at 05:57

## 2018-10-28 RX ADMIN — INSULIN HUMAN SCH UNIT: 100 INJECTION, SOLUTION PARENTERAL at 00:00

## 2018-10-28 RX ADMIN — TAZOBACTAM SODIUM AND PIPERACILLIN SODIUM SCH MLS/HR: 375; 3 INJECTION, SOLUTION INTRAVENOUS at 12:00

## 2018-10-28 RX ADMIN — HYDROCODONE BITARTRATE AND ACETAMINOPHEN PRN EA: 7.5; 325 TABLET ORAL at 11:03

## 2018-10-28 RX ADMIN — TAZOBACTAM SODIUM AND PIPERACILLIN SODIUM SCH MLS/HR: 375; 3 INJECTION, SOLUTION INTRAVENOUS at 00:07

## 2018-10-28 RX ADMIN — INSULIN HUMAN SCH UNIT: 100 INJECTION, SOLUTION PARENTERAL at 11:30

## 2018-10-28 RX ADMIN — INSULIN HUMAN SCH UNIT: 100 INJECTION, SOLUTION PARENTERAL at 21:00

## 2018-10-28 RX ADMIN — METOPROLOL TARTRATE SCH MG: 1 INJECTION, SOLUTION INTRAVENOUS at 09:08

## 2018-10-29 VITALS — DIASTOLIC BLOOD PRESSURE: 66 MMHG | SYSTOLIC BLOOD PRESSURE: 148 MMHG

## 2018-10-29 VITALS — DIASTOLIC BLOOD PRESSURE: 64 MMHG | SYSTOLIC BLOOD PRESSURE: 138 MMHG

## 2018-10-29 VITALS — DIASTOLIC BLOOD PRESSURE: 60 MMHG | SYSTOLIC BLOOD PRESSURE: 126 MMHG

## 2018-10-29 VITALS — SYSTOLIC BLOOD PRESSURE: 149 MMHG | DIASTOLIC BLOOD PRESSURE: 66 MMHG

## 2018-10-29 VITALS — SYSTOLIC BLOOD PRESSURE: 126 MMHG | DIASTOLIC BLOOD PRESSURE: 60 MMHG

## 2018-10-29 VITALS — SYSTOLIC BLOOD PRESSURE: 136 MMHG | DIASTOLIC BLOOD PRESSURE: 62 MMHG

## 2018-10-29 VITALS — SYSTOLIC BLOOD PRESSURE: 148 MMHG | DIASTOLIC BLOOD PRESSURE: 66 MMHG

## 2018-10-29 VITALS — DIASTOLIC BLOOD PRESSURE: 74 MMHG | SYSTOLIC BLOOD PRESSURE: 171 MMHG

## 2018-10-29 LAB
ALBUMIN SERPL-MCNC: 1.9 G/DL (ref 3.5–5)
ALBUMIN/GLOB SERPL: 0.6 {RATIO} (ref 0.8–2)
ALP SERPL-CCNC: 59 IU/L (ref 40–150)
ALT SERPL-CCNC: 40 IU/L (ref 0–55)
ANION GAP SERPL CALC-SCNC: 7.8 MMOL/L (ref 8–16)
BASOPHILS # BLD AUTO: 0.1 10*3/UL (ref 0–0.1)
BASOPHILS NFR BLD AUTO: 0.9 % (ref 0–1)
BUN SERPL-MCNC: < 5 MG/DL (ref 7–26)
BUN/CREAT SERPL: 9 (ref 6–25)
CALCIUM SERPL-MCNC: 8 MG/DL (ref 8.4–10.2)
CHLORIDE SERPL-SCNC: 111 MMOL/L (ref 98–107)
CO2 SERPL-SCNC: 23 MMOL/L (ref 22–29)
DEPRECATED NEUTROPHILS # BLD AUTO: 6.8 10*3/UL (ref 2.1–6.9)
EGFRCR SERPLBLD CKD-EPI 2021: > 60 ML/MIN (ref 60–?)
EOSINOPHIL # BLD AUTO: 0.7 10*3/UL (ref 0–0.4)
EOSINOPHIL NFR BLD AUTO: 5.5 % (ref 0–6)
ERYTHROCYTE [DISTWIDTH] IN CORD BLOOD: 17.4 % (ref 11.7–14.4)
GLOBULIN PLAS-MCNC: 3.3 G/DL (ref 2.3–3.5)
GLUCOSE SERPLBLD-MCNC: 105 MG/DL (ref 74–118)
HCT VFR BLD AUTO: 29.8 % (ref 34.2–44.1)
HGB BLD-MCNC: 10.1 G/DL (ref 12–16)
LYMPHOCYTES # BLD: 2.9 10*3/UL (ref 1–3.2)
LYMPHOCYTES NFR BLD AUTO: 25 % (ref 18–39.1)
MAGNESIUM SERPL-MCNC: 1.2 MG/DL (ref 1.3–2.1)
MCH RBC QN AUTO: 27.9 PG (ref 28–32)
MCHC RBC AUTO-ENTMCNC: 33.9 G/DL (ref 31–35)
MCV RBC AUTO: 82.3 FL (ref 81–99)
MONOCYTES # BLD AUTO: 1.2 10*3/UL (ref 0.2–0.8)
MONOCYTES NFR BLD AUTO: 9.9 % (ref 4.4–11.3)
NEUTS SEG NFR BLD AUTO: 58.3 % (ref 38.7–80)
PLATELET # BLD AUTO: 392 X10E3/UL (ref 140–360)
POTASSIUM SERPL-SCNC: 2.8 MMOL/L (ref 3.5–5.1)
RBC # BLD AUTO: 3.62 X10E6/UL (ref 3.6–5.1)
SODIUM SERPL-SCNC: 139 MMOL/L (ref 136–145)

## 2018-10-29 RX ADMIN — DEXTROSE AND SODIUM CHLORIDE SCH MLS/HR: 5; 900 INJECTION, SOLUTION INTRAVENOUS at 09:30

## 2018-10-29 RX ADMIN — METRONIDAZOLE SCH MLS/HR: 500 INJECTION, SOLUTION INTRAVENOUS at 06:02

## 2018-10-29 RX ADMIN — MAGNESIUM SULFATE IN WATER PRN MLS/HR: 40 INJECTION, SOLUTION INTRAVENOUS at 08:25

## 2018-10-29 RX ADMIN — HYDROCODONE BITARTRATE AND ACETAMINOPHEN PRN EA: 7.5; 325 TABLET ORAL at 11:11

## 2018-10-29 RX ADMIN — INSULIN HUMAN SCH UNIT: 100 INJECTION, SOLUTION PARENTERAL at 16:00

## 2018-10-29 RX ADMIN — POTASSIUM CHLORIDE SCH MLS/HR: 14.9 INJECTION, SOLUTION INTRAVENOUS at 12:08

## 2018-10-29 RX ADMIN — SODIUM CHLORIDE SCH MG: 900 INJECTION INTRAVENOUS at 06:02

## 2018-10-29 RX ADMIN — METRONIDAZOLE SCH MLS/HR: 500 INJECTION, SOLUTION INTRAVENOUS at 12:08

## 2018-10-29 RX ADMIN — INSULIN HUMAN SCH UNIT: 100 INJECTION, SOLUTION PARENTERAL at 11:30

## 2018-10-29 RX ADMIN — HYDROCODONE BITARTRATE AND ACETAMINOPHEN PRN EA: 7.5; 325 TABLET ORAL at 17:40

## 2018-10-29 RX ADMIN — METRONIDAZOLE SCH MLS/HR: 500 INJECTION, SOLUTION INTRAVENOUS at 17:40

## 2018-10-29 RX ADMIN — POTASSIUM CHLORIDE SCH MLS/HR: 14.9 INJECTION, SOLUTION INTRAVENOUS at 15:03

## 2018-10-29 RX ADMIN — INSULIN HUMAN SCH UNIT: 100 INJECTION, SOLUTION PARENTERAL at 21:00

## 2018-10-29 RX ADMIN — METRONIDAZOLE SCH MLS/HR: 500 INJECTION, SOLUTION INTRAVENOUS at 00:04

## 2018-10-29 RX ADMIN — INSULIN HUMAN SCH UNIT: 100 INJECTION, SOLUTION PARENTERAL at 07:30

## 2018-10-29 RX ADMIN — POTASSIUM CHLORIDE PRN MLS/HR: 14.9 INJECTION, SOLUTION INTRAVENOUS at 06:37

## 2018-10-29 RX ADMIN — HYDROCODONE BITARTRATE AND ACETAMINOPHEN PRN EA: 7.5; 325 TABLET ORAL at 05:25

## 2018-10-29 RX ADMIN — POTASSIUM CHLORIDE SCH MLS/HR: 14.9 INJECTION, SOLUTION INTRAVENOUS at 14:00

## 2018-10-30 VITALS — SYSTOLIC BLOOD PRESSURE: 129 MMHG | DIASTOLIC BLOOD PRESSURE: 61 MMHG

## 2018-10-30 VITALS — DIASTOLIC BLOOD PRESSURE: 73 MMHG | SYSTOLIC BLOOD PRESSURE: 161 MMHG

## 2018-10-30 VITALS — SYSTOLIC BLOOD PRESSURE: 129 MMHG | DIASTOLIC BLOOD PRESSURE: 60 MMHG

## 2018-10-30 VITALS — DIASTOLIC BLOOD PRESSURE: 70 MMHG | SYSTOLIC BLOOD PRESSURE: 156 MMHG

## 2018-10-30 VITALS — DIASTOLIC BLOOD PRESSURE: 59 MMHG | SYSTOLIC BLOOD PRESSURE: 124 MMHG

## 2018-10-30 LAB
ALBUMIN SERPL-MCNC: 1.8 G/DL (ref 3.5–5)
ALBUMIN/GLOB SERPL: 0.6 {RATIO} (ref 0.8–2)
ALP SERPL-CCNC: 49 IU/L (ref 40–150)
ALT SERPL-CCNC: 25 IU/L (ref 0–55)
ANION GAP SERPL CALC-SCNC: 11.2 MMOL/L (ref 8–16)
BASOPHILS # BLD AUTO: 0.1 10*3/UL (ref 0–0.1)
BASOPHILS NFR BLD AUTO: 1 % (ref 0–1)
BUN SERPL-MCNC: < 2 MG/DL (ref 7–26)
BUN/CREAT SERPL: 4 (ref 6–25)
CALCIUM SERPL-MCNC: 7.8 MG/DL (ref 8.4–10.2)
CHLORIDE SERPL-SCNC: 108 MMOL/L (ref 98–107)
CO2 SERPL-SCNC: 23 MMOL/L (ref 22–29)
DEPRECATED NEUTROPHILS # BLD AUTO: 7 10*3/UL (ref 2.1–6.9)
EGFRCR SERPLBLD CKD-EPI 2021: > 60 ML/MIN (ref 60–?)
EOSINOPHIL # BLD AUTO: 0.7 10*3/UL (ref 0–0.4)
EOSINOPHIL NFR BLD AUTO: 6.1 % (ref 0–6)
ERYTHROCYTE [DISTWIDTH] IN CORD BLOOD: 17.5 % (ref 11.7–14.4)
GLOBULIN PLAS-MCNC: 2.9 G/DL (ref 2.3–3.5)
GLUCOSE SERPLBLD-MCNC: 117 MG/DL (ref 74–118)
HCT VFR BLD AUTO: 30.4 % (ref 34.2–44.1)
HGB BLD-MCNC: 10.1 G/DL (ref 12–16)
LYMPHOCYTES # BLD: 2.3 10*3/UL (ref 1–3.2)
LYMPHOCYTES NFR BLD AUTO: 20.5 % (ref 18–39.1)
MAGNESIUM SERPL-MCNC: 1.5 MG/DL (ref 1.3–2.1)
MCH RBC QN AUTO: 27.8 PG (ref 28–32)
MCHC RBC AUTO-ENTMCNC: 33.2 G/DL (ref 31–35)
MCV RBC AUTO: 83.7 FL (ref 81–99)
MONOCYTES # BLD AUTO: 1.1 10*3/UL (ref 0.2–0.8)
MONOCYTES NFR BLD AUTO: 9.5 % (ref 4.4–11.3)
NEUTS SEG NFR BLD AUTO: 62.4 % (ref 38.7–80)
PLATELET # BLD AUTO: 379 X10E3/UL (ref 140–360)
POTASSIUM SERPL-SCNC: 3.2 MMOL/L (ref 3.5–5.1)
RBC # BLD AUTO: 3.63 X10E6/UL (ref 3.6–5.1)
SODIUM SERPL-SCNC: 139 MMOL/L (ref 136–145)

## 2018-10-30 RX ADMIN — INSULIN HUMAN SCH UNIT: 100 INJECTION, SOLUTION PARENTERAL at 07:30

## 2018-10-30 RX ADMIN — HYDROCODONE BITARTRATE AND ACETAMINOPHEN PRN EA: 7.5; 325 TABLET ORAL at 00:42

## 2018-10-30 RX ADMIN — METRONIDAZOLE SCH MLS/HR: 500 INJECTION, SOLUTION INTRAVENOUS at 12:19

## 2018-10-30 RX ADMIN — METRONIDAZOLE SCH MLS/HR: 500 INJECTION, SOLUTION INTRAVENOUS at 06:17

## 2018-10-30 RX ADMIN — METRONIDAZOLE SCH MLS/HR: 500 INJECTION, SOLUTION INTRAVENOUS at 00:29

## 2018-10-30 RX ADMIN — SODIUM CHLORIDE SCH MG: 900 INJECTION INTRAVENOUS at 06:17

## 2018-10-30 RX ADMIN — INSULIN HUMAN SCH UNIT: 100 INJECTION, SOLUTION PARENTERAL at 11:30

## 2018-10-30 NOTE — DISCHARGE SUMMARY
PRIMARY CARE DOCTOR:  Dr. Sharpe with [facility]



FINAL DIAGNOSIS:  Ischemic colitis with dead bowel.



SECONDARY DIAGNOSES

1. Acalculous cholecystitis.

2. Peripheral vascular disease.

3. Hypertension.

4. Diabetes.

5. Dyslipidemia.

6. Peptic ulcer disease.

7. Possible Crohn's disease. 



CONSULTANTS

1. Dr. Thanh Grossman, surgery.

2. Dr. Kovacs, cardiology.



PROCEDURES/STUDIES PERFORMED

1. CT abdomen and pelvis, which showed ischemic distal small bowel with 

pneumatosis and portal vein gas.  Also, narrowing/occlusion of the 

celiac and SMA origins but patent distally. 

2. Right colectomy with resection of terminal ileum and cholecystectomy. 

 



HISTORY:  Per [facility].



HOSPITAL COURSE:  The patient was emergently taken to surgery.  She did 

well.  The patient was kept on Zosyn and IV Flagyl.  She did well.  

Currently, the patient is tolerating a soft diet.  Will likely go home 

later on today if okay with surgery.  The patient is currently ambulating 

as well.  I have also discussed this case in detail with Dr. Kovacs.  At 

this time, there is no indication for stenting of the SMA or celiac artery. 

 I am waiting to hear back to see whether we should put her on a baby 

aspirin a day.  The patient was taken off a baby aspirin 2 months ago due 

to peptic ulcer disease.  Since then, the patient has been on PPI and 

Carafate.  She is waiting for a repeat EGD.  The patient was seen and 

examined today. It took 32 minutes total to discharge this patient.  



CONDITION ON DISCHARGE:  Improved.



DISCHARGE MEDICATIONS:  Please see medication reconciliation form.



FOLLOWUP:  The patient will follow up with the surgeon and the PCP in 1 to 

2 weeks.  



 



 _________________________________

TUAN SIMPSON M.D.



DD:  10/30/2018 10:35

DT:  10/30/2018 12:28

Job#:  [id number] 



cc:DR. SHARPE WITH [facility]

## 2018-11-09 ENCOUNTER — HOSPITAL ENCOUNTER (OUTPATIENT)
Dept: HOSPITAL 88 - OR | Age: 63
Discharge: HOME | End: 2018-11-09
Attending: INTERNAL MEDICINE
Payer: COMMERCIAL

## 2018-11-09 VITALS — SYSTOLIC BLOOD PRESSURE: 141 MMHG | DIASTOLIC BLOOD PRESSURE: 57 MMHG

## 2018-11-09 DIAGNOSIS — E11.22: ICD-10-CM

## 2018-11-09 DIAGNOSIS — K21.0: ICD-10-CM

## 2018-11-09 DIAGNOSIS — K31.7: ICD-10-CM

## 2018-11-09 DIAGNOSIS — N18.9: ICD-10-CM

## 2018-11-09 DIAGNOSIS — Z87.11: Primary | ICD-10-CM

## 2018-11-09 DIAGNOSIS — K29.70: ICD-10-CM

## 2018-11-09 DIAGNOSIS — Z79.84: ICD-10-CM

## 2018-11-09 DIAGNOSIS — Z88.8: ICD-10-CM

## 2018-11-09 DIAGNOSIS — R11.0: ICD-10-CM

## 2018-11-09 DIAGNOSIS — Z01.812: ICD-10-CM

## 2018-11-09 LAB
ANION GAP SERPL CALC-SCNC: 15.9 MMOL/L (ref 8–16)
BASOPHILS # BLD AUTO: 0.1 10*3/UL (ref 0–0.1)
BASOPHILS NFR BLD AUTO: 1.7 % (ref 0–1)
BUN SERPL-MCNC: < 5 MG/DL (ref 7–26)
BUN/CREAT SERPL: 8 (ref 6–25)
CALCIUM SERPL-MCNC: 9.3 MG/DL (ref 8.4–10.2)
CHLORIDE SERPL-SCNC: 105 MMOL/L (ref 98–107)
CO2 SERPL-SCNC: 17 MMOL/L (ref 22–29)
DEPRECATED NEUTROPHILS # BLD AUTO: 3.8 10*3/UL (ref 2.1–6.9)
EGFRCR SERPLBLD CKD-EPI 2021: > 60 ML/MIN (ref 60–?)
EOSINOPHIL # BLD AUTO: 0.3 10*3/UL (ref 0–0.4)
EOSINOPHIL NFR BLD AUTO: 4.7 % (ref 0–6)
ERYTHROCYTE [DISTWIDTH] IN CORD BLOOD: 18 % (ref 11.7–14.4)
GLUCOSE SERPLBLD-MCNC: 86 MG/DL (ref 74–118)
HCT VFR BLD AUTO: 33.5 % (ref 34.2–44.1)
HGB BLD-MCNC: 10.9 G/DL (ref 12–16)
LYMPHOCYTES # BLD: 2 10*3/UL (ref 1–3.2)
LYMPHOCYTES NFR BLD AUTO: 29.2 % (ref 18–39.1)
MCH RBC QN AUTO: 28.2 PG (ref 28–32)
MCHC RBC AUTO-ENTMCNC: 32.5 G/DL (ref 31–35)
MCV RBC AUTO: 86.6 FL (ref 81–99)
MONOCYTES # BLD AUTO: 0.7 10*3/UL (ref 0.2–0.8)
MONOCYTES NFR BLD AUTO: 9.5 % (ref 4.4–11.3)
NEUTS SEG NFR BLD AUTO: 54.6 % (ref 38.7–80)
PLATELET # BLD AUTO: 490 X10E3/UL (ref 140–360)
POTASSIUM SERPL-SCNC: 3.9 MMOL/L (ref 3.5–5.1)
RBC # BLD AUTO: 3.87 X10E6/UL (ref 3.6–5.1)
SODIUM SERPL-SCNC: 134 MMOL/L (ref 136–145)

## 2018-11-09 PROCEDURE — 36415 COLL VENOUS BLD VENIPUNCTURE: CPT

## 2018-11-09 PROCEDURE — 43239 EGD BIOPSY SINGLE/MULTIPLE: CPT

## 2018-11-09 PROCEDURE — 93005 ELECTROCARDIOGRAM TRACING: CPT

## 2018-11-09 PROCEDURE — 80048 BASIC METABOLIC PNL TOTAL CA: CPT

## 2018-11-09 PROCEDURE — 85025 COMPLETE CBC W/AUTO DIFF WBC: CPT

## 2018-11-09 PROCEDURE — 82948 REAGENT STRIP/BLOOD GLUCOSE: CPT

## 2018-11-09 NOTE — OPERATIVE REPORT
DATE OF PROCEDURE:  November 09, 2018 



REFERRING PHYSICIAN:  Dr. Elizabeth Sharpe and Dr. Tiago Grossman.



PROCEDURE PERFORMED:  Esophagogastroduodenoscopy with biopsies.  



INDICATIONS FOR ESOPHAGOGASTRODUODENOSCOPY:  History of gastric ulcer, 

nausea.



MEDICATION:  Patient was done under MAC.  Please see anesthesiologist's 

note.



PROCEDURE:  With patient in the left lateral decubitus position, the 

flexible fiberoptic Olympus gastroscope was introduced into the esophagus 

under direct visualization without any difficulty.  There was some patchy 

erythema noted in the distal esophagus.  The scope was then advanced with 

ease into the stomach, and the mucosa overlying the body appeared somewhat 

atrophic, and biopsies were obtained.  There was a minute nodule noted in 

the distal body anterior wall, and that was biopsied.  There was some also 

atrophic-appearing mucosa in the proximal antrum, and the distal antrum 

appeared diffusely erythematous and edematous, and biopsies were obtained.  

The pylorus was intubated with ease, and the scope was advanced all the way 

to the 2nd portion of the duodenum.  The duodenal folds appeared somewhat 

flattened, and biopsies were obtained to rule out sprue.  The scope was 

then withdrawn back into the stomach and retroflexed, and the cardia 

appeared to be within normal limits.  There were some mild atrophic changes 

noted in the fundus, also.  The scope was then straightened out.  The 

stomach was decompressed.  The scope was subsequently withdrawn.  Patient 

tolerated procedure well.



IMPRESSION:  

1. Mild distal esophagitis.

2. Gastritis, antrum, biopsied.

3. Rule out atrophic gastritis, body.

4. Minute nodule, distal body anterior wall, biopsied.

5. Rule out sprue.



PLAN:  Follow up histology.  Continue Protonix 40 mg 1 p.o. q.a.m. a.c. as 

well as Carafate 1 gram p.o. a.c. t.i.d. and nightly.  











DD:  11/09/2018 13:55

DT:  11/09/2018 14:08

Job#:  [id number]

cc:MD TIAGO VILLANUEVA MD

## 2018-11-09 NOTE — XMS REPORT
Clinical Summary

                             Created on: 2018



Viky Ho

External Reference #: [id number]

: 1955

Sex: Female



Demographics







                          Address                   [address] [place] [address]  [address]

 

                          Home Phone                [phone]

 

                          Preferred Language        English

 

                          Marital Status            Unknown

 

                          Taoist Affiliation     Unknown

 

                          Race                      Unknown

 

                          Ethnic Group              Unknown





Author







                          Author                    [facility]

 

                          Organization              [facility]

 

                          Address                   Unknown

 

                          Phone                     Unavailable







Care Team Providers







                    Care Team Member Name    Role                Phone

 

                          PCP                       Unavailable







Allergies

Not on File



Current Medications

Not on file



Active Problems





Not on file



Encounters







    



              Date         Type         Specialty     Care Team     Description

 

    



                     2018          Clinical            [facility]  



                                         Support   



after 2017



Immunizations







  



                     Name                Dates Previously Given     Next Due

 

  



                           FLUCELVAX QUAD PF (0.5mL     2018 



                                         syringe)  







Social History







    



              Tobacco Use     Types        Packs/Day     Years Used     Date

 

    



                                         Never Assessed    









 



                           Sex Assigned at Birth     Date Recorded

 

 



                                         Not on file 







Last Filed Vital Signs

Not on file



Plan of Treatment







   



                 Health Maintenance     Due Date        Last Done       Comments

 

   



                           CERVICAL CANCER SCREENING     1978  

 

   



                           BREAST CANCER SCREENING     2007  

 

   



                           SHINGRIX VACCINE (#1)     2007  

 

   



                     COLON CANCER SCREENING     2028 

 

   



                     ZOSTER VACCINE      Completed           2017 

 

   



                     INFLUENZA VACCINE     Completed           2018, 10/19/2015 







Results

Not on fileafter 2017



Insurance







     



            Payer      Benefit     Subscriber ID     Type       Phone      Address



                                         Plan /    



                                         Group    

 

     



                 [facility]             [employer]      xxxxxxxxx       HMO/PPO  



                                         THCARE    



                                         CHOICE/CHO    



                                         ICE +    









     



            Guarantor Name     Account     Relation to     Date of     Phone      Billing Address



                     Type                Patient             Birth  

 

     



            VIKY HO     Personal/F     Self       1955     Home:      [address] [place] [address]               [phone]     [place] [address]

## 2018-12-18 NOTE — CONSULTATION
DATE OF CONSULT:  October 24, 2018



PRESENTING COMPLAINT:  Severe abdominal pain. 



This 63-year-old female was admitted to the hospital with about a 24-hour 

history of lower abdominal pain which started all of a sudden and has 

become progressively worse.  The patient describes the pain as excruciating 

and constant.  It never goes away.  She states that she has also had some 

nausea, but has had no vomiting.  The patient has a history of chronic 

diarrhea for over 6 months.  She denies any bloody stools or any black 

stools.  



PAST MEDICAL HISTORY:  Remarkable for hypertension, diabetes, 

hyperlipidemia, irritable bowel syndrome and there is also a question of 

Crohn's disease.



MEDICATIONS:  Please refer to the MAR.



ALLERGIES:  NONE. 



PHYSICAL EXAMINATION 

GENERAL:  At the time that we saw the patient, revealed a female lying in 

bed in excruciating abdominal pain.   She was tachycardiac and tachypneic.  

She was obviously dehydrated. 

VITAL SIGNS:  She was afebrile. 

HEENT:  Show very dry mucosa. 

NECK:  No nodes, masses or bruits. 

LUNGS:  Clear to auscultation. 

HEART:  Regular rate and rhythm. 

ABDOMEN:  Diffusely tender with guarding and rebound present.  There were 

no palpable masses. 

EXTREMITIES:  Good pulses bilaterally.  



CT scan of the abdomen was performed and showed the patient to have 

pneumatosis intestinalis and portal venous gas with severe atherosclerotic 

disease of the mesenteric vessels.  



ASSESSMENT:  Acute surgical abdomen with hypotension and sepsis and shock 

secondary to ischemic bowel. 



PLAN:  Aggressive hydration, antibiotics and emergency laparotomy with 

probable bowel resection.  







DD:  12/18/2018 18:13

DT:  12/18/2018 18:26

Job#:  [id number]

## 2019-02-09 ENCOUNTER — HOSPITAL ENCOUNTER (OUTPATIENT)
Dept: HOSPITAL 88 - OR | Age: 64
Discharge: HOME | End: 2019-02-09
Attending: INTERNAL MEDICINE
Payer: COMMERCIAL

## 2019-02-09 DIAGNOSIS — Z98.0: ICD-10-CM

## 2019-02-09 DIAGNOSIS — Z79.82: ICD-10-CM

## 2019-02-09 DIAGNOSIS — K64.8: ICD-10-CM

## 2019-02-09 DIAGNOSIS — Z87.891: ICD-10-CM

## 2019-02-09 DIAGNOSIS — K57.30: ICD-10-CM

## 2019-02-09 DIAGNOSIS — K20.9: ICD-10-CM

## 2019-02-09 DIAGNOSIS — E78.00: ICD-10-CM

## 2019-02-09 DIAGNOSIS — I10: ICD-10-CM

## 2019-02-09 DIAGNOSIS — E11.9: ICD-10-CM

## 2019-02-09 DIAGNOSIS — K52.9: ICD-10-CM

## 2019-02-09 DIAGNOSIS — K29.50: Primary | ICD-10-CM

## 2019-02-09 DIAGNOSIS — K25.9: ICD-10-CM

## 2019-02-09 DIAGNOSIS — Z88.8: ICD-10-CM

## 2019-02-09 LAB — LACTOFERRIN STL QL: POSITIVE

## 2019-02-09 PROCEDURE — 87493 C DIFF AMPLIFIED PROBE: CPT

## 2019-02-09 PROCEDURE — 43239 EGD BIOPSY SINGLE/MULTIPLE: CPT

## 2019-02-09 PROCEDURE — 83993 ASSAY FOR CALPROTECTIN FECAL: CPT

## 2019-02-09 PROCEDURE — 87045 FECES CULTURE AEROBIC BACT: CPT

## 2019-02-09 PROCEDURE — 45380 COLONOSCOPY AND BIOPSY: CPT

## 2019-02-09 PROCEDURE — 87328 CRYPTOSPORIDIUM AG IA: CPT

## 2019-02-09 PROCEDURE — 83630 LACTOFERRIN FECAL (QUAL): CPT

## 2019-02-09 PROCEDURE — 45378 DIAGNOSTIC COLONOSCOPY: CPT

## 2019-02-09 PROCEDURE — 87177 OVA AND PARASITES SMEARS: CPT

## 2019-02-09 NOTE — OPERATIVE REPORT
DATE OF PROCEDURE:    



REFERRING PHYSICIAN:  



PROCEDURE PERFORMED:  Esophagogastroduodenoscopy with biopsies and 

colonoscopy with biopsies.



INDICATIONS FOR ESOPHAGOGASTRODUODENOSCOPY:  History of coffee-ground 

emesis, melena, nausea, and vomiting.



INDICATIONS FOR COLONOSCOPY:  Chronic diarrhea.



MEDICATION:  Patient was done under MAC.  Please see anesthesiologist's 

note.



PROCEDURE:  With patient in left lateral decubitus position, flexible 

fiberoptic Olympus gastroscope was introduced into the esophagus under 

direct visualization without any difficulty.  There were some patchy 

erythema noted in distal esophagus.  The scope was then advanced with ease 

into the stomach and the mucosa overlying the body and the antrum revealed 

some patchy areas of atrophy and biopsies were obtained to rule out 

atrophic gastritis.  Three ulcerations were noted in the distal body 

without active bleeding or stigmata of recent hemorrhage and biopsies were 

obtained.  Pylorus was of normal contour and shape, was intubated with ease 

and the scope was advanced all way to the 2nd portion of the duodenum.  

Some folds appeared somewhat flattened and some were somewhat scalloped and 

biopsies were obtained to rule out sprue.  The scope was then withdrawn 

back into the stomach and retroflexed and mucosa overlying the fundus and 

the cardia appeared to be within normal limits.  The scope was then 

straightened out.  The stomach was decompressed.  The scope was 

subsequently withdrawn.  Patient tolerated the procedure well.



IMPRESSION

1. Distal esophagitis, mild.

2. Rule out atrophic gastritis.

3. Gastric ulcers distal body without active bleeding or stigmata of 

recent hemorrhage biopsied.

4. Rule out sprue.



PLAN:  Follow up histology.  Increase Protonix to 40 mg 1 p.o. a.c. b.i.d.  

Continue Carafate 1 gram p.o. a.c. t.i.d. and at bedtime.  Add Reglan 10 mg 

1 p.o. a.c. t.i.d. and at bedtime. 



The patient was then turned around and after adequate lubrication of the 

anal canal, a flexible fiberoptic Olympus colonoscope was inserted into the 

rectum with ease, advanced all the way to the ileocolic anastomosis site.  

The anastomotic site appeared intact.  The scope was then advanced into the 

terminal ileum and biopsies were obtained.  The scope was then withdrawn 

back into the colon.  It was then withdrawn slowly and the mucosa overlying 

the transverse, descending, and sigmoid revealed some patchy mild 

inflammatory changes and random biopsies were obtained.  Some diverticular 

disease was noted in the distal descending and the sigmoid colon.  The 

rectum grossly appeared to be within normal limits.  The scope was then 

retroflexed into the distal rectum and small internal hemorrhoids were 

noted none of which was actively bleeding.  The scope was then straightened 

out.  It was subsequently withdrawn after securing an adequate stool 

specimen that was sent for the appropriate stool studies.  Patient 

tolerated the procedure well.



IMPRESSION:

1. Ileocolic anastomosis, intact.

2. Rule out microscopic colitis. 

3. Diverticulosis. 

4. Internal hemorrhoids, none actively bleeding.





PLAN:  Follow up histology.  Follow up stool studies.  Initiate Questran 1 

packet p.o. b.i.d. and Bentyl 20 mg 1 p.o. t.i.d. and VSL #3 one p.o. 

b.i.d.  Patient might benefit from a followup colonoscopy in 5 to 10 years.







DD:  02/09/2019 13:37

DT:  02/09/2019 14:38

Job#:  [id number] [facility]



cc:JAKE SHEPARD M.D.

## 2019-02-10 LAB — C DIFFICILE TOXIN A&B AMP PROB: NEGATIVE

## 2019-02-11 NOTE — XMS REPORT
Clinical Summary

                             Created on: 2019



Viky Ho

External Reference #: [id number]

: 1955

Sex: Female



Demographics







                          Address                   [address] [place]  [address]

 

                          Home Phone                [phone]

 

                          Preferred Language        English

 

                          Marital Status            Unknown

 

                          Rastafari Affiliation     Unknown

 

                          Race                      Unknown

 

                          Ethnic Group              Unknown





Author







                          Author                    Alex Flores

 

                          Organization              [facility]

 

                          Address                   Unknown

 

                          Phone                     Unavailable







Care Team Providers







                    Care Team Member Name    Role                Phone

 

                          PCP                       Unavailable







Allergies

Not on File



Medications

Not on file



Active Problems





Not on file



Encounters







                          Care Team                 Description



                     Date                Type                Specialty  

 

                                                     



                     2018          Clinical            [facility]  



                                         Support   



after 02/10/2018



Immunizations







  



                     Name                Dates Previously Given     Next Due

 

  



                           FLUCELVAX QUAD PF (0.5mL     2018 



                                         syringe)  







Social History







                                        Date



                 Tobacco Use     Types           Packs/Day       Years Used 

 

                                         



                                         Never Assessed    









 



                           Sex Assigned at Birth     Date Recorded

 

 



                                         Not on file 









                                        Industry



                           Job Start Date            Occupation 

 

                                        Not on file



                           Not on file               Not on file 









                                        Travel End



                           Travel History            Travel Start 

 





                                         No recent travel history available.







Last Filed Vital Signs

Not on file



Plan of Treatment







   



                 Health Maintenance     Due Date        Last Done       Comments

 

   



                           CERVICAL CANCER SCREENING     1978  

 

   



                           BREAST CANCER SCREENING     2007  

 

   



                           COLON CANCER SCREENING     2007  

 

   



                           SHINGLES VACCINES (1 of     2007  



                                         2)   

 

   



                     INFLUENZA VACCINE     Completed           2018, 10/19/2015 







Results

Not on fileafter 02/10/2018



Insurance







     



            Payer      Benefit     Subscriber ID     Type       Phone      Address



                                         Plan /    



                                         Group    

 

     



                 [facility]      xxxxxxxxx       HMO/PPO  



                                         THCARE    



                                         CHOICE/CHO    



                                         ICE +    









     



            Guarantor Name     Account     Relation to     Date of     Phone      Billing Address



                     Type                Patient             Birth  

 

     



            Viky Ho     Personal/F     Self       1955     [phone]     [address]               (Home)              [place] [address]







Advance Directives





Patient has advance care planning documents on file. For more information, pleas
e contact:



Alex Flores



[address] [place] [address]

## 2019-03-08 ENCOUNTER — HOSPITAL ENCOUNTER (OUTPATIENT)
Dept: HOSPITAL 88 - CT | Age: 64
End: 2019-03-08
Attending: INTERNAL MEDICINE
Payer: COMMERCIAL

## 2019-03-08 DIAGNOSIS — K58.0: Primary | ICD-10-CM

## 2019-03-08 LAB
BUN SERPL-MCNC: 16 MG/DL (ref 7–26)
BUN/CREAT SERPL: 17 (ref 6–25)
EGFRCR SERPLBLD CKD-EPI 2021: > 60 ML/MIN (ref 60–?)

## 2019-03-08 PROCEDURE — 36415 COLL VENOUS BLD VENIPUNCTURE: CPT

## 2019-03-08 PROCEDURE — 74177 CT ABD & PELVIS W/CONTRAST: CPT

## 2019-03-08 PROCEDURE — 84520 ASSAY OF UREA NITROGEN: CPT

## 2019-03-08 PROCEDURE — 82565 ASSAY OF CREATININE: CPT

## 2019-03-08 NOTE — DIAGNOSTIC IMAGING REPORT
EXAM: CT Abdomen and Pelvis WITH contrast  

INDICATION:      ^IRRITABLE BOWEL SYNDROME W/ DIARRHEA. Diarrhea. Weight

loss. Bowel resection in October 24, 2018. Pain since May. 

COMPARISON: CT abdomen and pelvis 10/23/2018

TECHNIQUE: Abdomen and pelvis were scanned utilizing a multidetector helical

scanner from the lung base to the pubic symphysis after administration of IV

contrast. Coronal and sagittal reformations were obtained. Routine protocol was

performed. Scan was performed when during portal venous phase.    

     IV CONTRAST: 100 mL of Isovue 370

     ORAL CONTRAST: Water

            

COMPLICATIONS: None



RADIATION DOSE:

     Total DLP: 181.43 mGy*cm

     Estimated effective dose: (DLP x 0.015 x size factor) mSv

     CTDIvol has been reviewed. It is below the limits set by the Radiation

Protocol Committee (RPC).

     Dose modulation, iterative reconstruction, and/or weight based adjustment

of the mA/kV was utilized to reduce the radiation dose to as low as reasonably

achievable. 



FINDINGS:



LINES and TUBES: None.



LOWER THORAX:  Unremarkable



HEPATOBILIARY: The liver is diffuse hypodense compared to the spleen,

consistent with diffuse hepatic diffuse hepatic steatosis.  No focal hepatic

lesions. No biliary ductal dilation. 



GALLBLADDER: Gallbladder is not visualized.



SPLEEN: No splenomegaly. 



PANCREAS: No focal masses or ductal dilatation.  Peripancreatic fluid along the

anterior aspect of the pancreatic neck and proximal body.



ADRENALS: No adrenal nodules    



KIDNEYS/URETERS: Kidneys enhance symmetrically.  No hydronephrosis. No cystic

or solid mass lesions.  No stones.



GI TRACT: Postoperative changes of right hemicolectomy with anastomosis in the

left abdomen. No abnormal distention, wall thickening, or evidence of bowel

obstruction.  Small bowel colon are diffusely filled with liquid material. 

Appendix is normal.



PELVIC ORGANS/BLADDER: Unchanged calcified uterine fibroid measured 10.6 cm in

transverse dimension.



LYMPH NODES: No lymphadenopathy.



VESSELS: There is severe atherosclerotic disease in the aorta and major

arterial branches.



PERITONEUM / RETROPERITONEUM: No free air or fluid.



BONES: Unremarkable.



SOFT TISSUES: Unremarkable.            



IMPRESSION: 

1.  Postoperative changes of right hemicolectomy.

2.  Diffuse fluid-filled small bowel and colon, which can be seen in enteritis

and colitis.

3.  Diffuse hepatic steatosis. Gallbladder is no longer visualized.

4.  Peripancreatic fluid, likely pancreatitis.



Signed by: Dr. Brandon Coronel M.D. on 3/8/2019 6:14 PM

## 2019-04-02 ENCOUNTER — HOSPITAL ENCOUNTER (OUTPATIENT)
Dept: HOSPITAL 88 - DX | Age: 64
End: 2019-04-02
Attending: INTERNAL MEDICINE
Payer: COMMERCIAL

## 2019-04-02 DIAGNOSIS — K52.9: Primary | ICD-10-CM

## 2019-04-02 PROCEDURE — 74250 X-RAY XM SM INT 1CNTRST STD: CPT

## 2019-04-02 NOTE — DIAGNOSTIC IMAGING REPORT
EXAM: Fluoroscopic small bowel series

INDICATION:  Inflammatory bowel disease, status post right hemicolectomy

COMPARISON:  CT abdomen and pelvis 3/8/2019, CT abdomen and pelvis 10/23/2018

 

FINDINGS: 

 radiograph shows a nonobstructive bowel gas pattern. No mass effect or

organomegaly. Large central pelvic heterogeneous calcification shown to

represent a degenerated fibroid on comparison CT.



There is diffuse mild distention of small bowel loops with normal transit time.

No obstruction or fixed stricture.



The terminal ileum is not present (status post partial small bowel resection

and right hemicolectomy).



Fluoroscopy time: 0.4 minutes

Air Kerma 6.6 mGy





IMPRESSION: 

Diffuse mild dilatation of small bowel loops without evidence of stricture or

obstruction.



Status post partial small bowel resection and right hemicolectomy.







Signed by: Dr. Sanket Medeiros M.D. on 4/2/2019 3:53 PM

## 2019-06-12 ENCOUNTER — HOSPITAL ENCOUNTER (OUTPATIENT)
Dept: HOSPITAL 88 - OR | Age: 64
Discharge: HOME | End: 2019-06-12
Attending: INTERNAL MEDICINE
Payer: COMMERCIAL

## 2019-06-12 VITALS — SYSTOLIC BLOOD PRESSURE: 134 MMHG | DIASTOLIC BLOOD PRESSURE: 60 MMHG

## 2019-06-12 DIAGNOSIS — E11.22: ICD-10-CM

## 2019-06-12 DIAGNOSIS — K25.9: ICD-10-CM

## 2019-06-12 DIAGNOSIS — Z88.8: ICD-10-CM

## 2019-06-12 DIAGNOSIS — R19.7: ICD-10-CM

## 2019-06-12 DIAGNOSIS — Z79.82: ICD-10-CM

## 2019-06-12 DIAGNOSIS — K28.9: ICD-10-CM

## 2019-06-12 DIAGNOSIS — N18.9: ICD-10-CM

## 2019-06-12 DIAGNOSIS — I12.9: ICD-10-CM

## 2019-06-12 DIAGNOSIS — Z87.891: ICD-10-CM

## 2019-06-12 DIAGNOSIS — D50.9: Primary | ICD-10-CM

## 2019-06-12 DIAGNOSIS — Z01.810: ICD-10-CM

## 2019-06-12 DIAGNOSIS — E78.5: ICD-10-CM

## 2019-06-12 PROCEDURE — 86256 FLUORESCENT ANTIBODY TITER: CPT

## 2019-06-12 PROCEDURE — 44361 SMALL BOWEL ENDOSCOPY/BIOPSY: CPT

## 2019-06-12 PROCEDURE — 85651 RBC SED RATE NONAUTOMATED: CPT

## 2019-06-12 PROCEDURE — 82941 ASSAY OF GASTRIN: CPT

## 2019-06-12 PROCEDURE — 86140 C-REACTIVE PROTEIN: CPT

## 2019-06-12 PROCEDURE — 93005 ELECTROCARDIOGRAM TRACING: CPT

## 2019-06-12 PROCEDURE — 82607 VITAMIN B-12: CPT

## 2019-06-12 PROCEDURE — 43270 EGD LESION ABLATION: CPT

## 2019-06-12 PROCEDURE — 44369 SMALL BOWEL ENDOSCOPY: CPT

## 2019-06-12 PROCEDURE — 43239 EGD BIOPSY SINGLE/MULTIPLE: CPT

## 2019-06-12 PROCEDURE — 86671 FUNGUS NES ANTIBODY: CPT

## 2019-06-12 PROCEDURE — 36415 COLL VENOUS BLD VENIPUNCTURE: CPT

## 2019-06-12 NOTE — XMS REPORT
Continuity of Care Document

                             Created on: 2018



RAJENDRA HUBER

External Reference #: [id number]

: 1955

Sex: Female



Demographics







                          Address                   [address] [place] [address]  [id number]

 

                          Home Phone                [phone]

 

                          Preferred Language        Unknown

 

                          Marital Status            Unknown

 

                          Taoism Affiliation     Unknown

 

                          Race                      Unknown

 

                          Ethnic Group              Unknown





Author







                          Author                    [facility]              Interface

 

                          Address                   Unknown

 

                          Phone                     Unavailable



                                                    



Problems

                    





                    Problem                            Status                            Onset Date     

                          Classification                            Date Reported       

                          Comments                            Source                    



                                                                        



Medications

                    





                    Medication                            Details                            Route      

                          Status                            Patient Instructions         

                          Ordering Provider                            Order Date           

                                        Source                    

 

                          Dicyclomine Hcl 20 Mg Tablet                            Every 8 Hours             

                                                Active                                      

                          [facility]                            2018                        

                                        [facility]                    

 

                          Pantoprazole Sodium (Protonix) 40 Mg Tablet.dr                            Every 12

 Hours                                                        Active                 

                                                [facility]                            2018     

                                        [facility]                    

 

                          Prednisone 20 Mg Tab                            Every 12 Hours                    

                                                Active                                             

                    [facility]                            2018                            [facility]                    

 

                          Sucralfate (Carafate) 1 Gm Tablet                            Before Meals And At Bedtime

                                                        Active                       

                                                [facility]                            2018           

                                        [facility]                    

 

                          Lisinopril 10 Mg Tablet, 40 Mg Oral                            Twice A Day        

                                                Active                                 

                                                      2018                        

                                        [facility]                    

 

                                        Colestipol Hcl,Micronized (Colestipol Hcl) 1 Gm Tablet, 1 Gm Oral               

                    5 Times Daily                                                        Active

                                                                                    2018

                                        [facility]                

    

 

                          Metformin Hcl 500 Mg Tablet, 1000 Mg Oral                            Twice A Day  

                                                      Active                         

                                                                            2018                  

                                        [facility]                    

 

                          Amlodipine Besylate 5 Mg Tablet                            Daily                  

                                                Active                                           

                                                                            [facility]                    

 

                          Atenolol (Tenormin) 50 Mg Tablet                            Bedtime               

                                                Active                                        

                                                                            [facility]                    

 

                    Basaglar Insulin Pen                            Bedtime                             

                           Active                                                    

                                                                            [facility]                    

 

                          Colestipol Hcl,Micronized (Colestipol Hcl) 1 Gm Tablet                            

Before Breakfast                                                        Active       

                                                                                       

                                        [facility]                    

 

                          Glimepiride 2 Mg Tablet                            Bedtime                        

                                                Active                                                 

                                                                            [facility]                    

 

                                        Losartan/Hydrochlorothiazide (Losartan-Hctz 100-25 Mg Tab) 1 Each Tablet        

                    Daily                                                        Active

                                                                                       

                                        [facility]                   

 

 

                    Lovastatin 20 Mg Tablet                            Daily                            

                            Active                                                   

                                                                            [facility]                    



                                                                                
                                                                                
                                                                                
                                                     



Allergies, Adverse Reactions, Alerts

                    





                    Substance                            Category                            Reaction   

                          Severity                            Reaction type           

                          Status                            Date Reported                     

                          Comments                            Source                    

 

                    Atorvastatin                                                                        

                          Unknown                            Allergy to Substance                  

                    Active                            2018                                 

                                        [facility]                    



                                                                        



Immunizations

                    





                    Immunization                            Date Given                            Site  

                          Status                            Last Updated             

                          Comments                            Source                    



                                                                        



Results

                    





                    Order Name                            Results                            Value      

                          Reference Range                            Date                

                          Interpretation                            Comments                       

                                        Source                    

 

                          Automated blood hematocrit (volume fraction)                            Automated 

blood hematocrit (volume fraction)                            31.6                 

                          34.2 - 44.1                            2018                       

                                                                            [facility]                    

 

                                        Blood cobalamin (vitamin B12) measurement (mass/volume)                         

                                        Blood cobalamin (vitamin B12) measurement (mass/volume)                         

                    560                              213 - 816                            2018    

                                                                                [facility]                    

 

                          Blood hemoglobin measurement (moles/volume)                            Blood hemoglobin

 measurement (moles/volume)                            10.7                        

                    12.0 - 16.0                            2018                                

                                                      [facility]                    

 

                          Serum or plasma potassium measurement (moles/volume)                            Serum

 or plasma potassium measurement (moles/volume)                            4.0     

                          3.5 - 5.1                            2018             

                                                                            [facility]                    

 

                                        Capillary blood glucose measurement by glucometer (mass/volume)                 

                                        Capillary blood glucose measurement by glucometer (mass/volume)         

                          197                              70 - 120                         

                    2018                                                                          

                                        [facility]                    

 

                          Automated blood basophil count (count/volume)                            Automated

 blood basophil count (count/volume)                            0.1                

                          0.0 - 0.1                            2018                        

                                                                            [facility]                    

 

                                        Automated blood basophil count as percentage of total leukocytes                

                                        Automated blood basophil count as percentage of total leukocytes       

                          0.5                              0.0 - 1.0                      

                    2018                                                                       

                                        [facility]                    

 

                          Automated blood eosinophil count                            Automated blood eosinophil

 count                            0.2                              0.0 - 0.4         

                    2018                                                          

                                        [facility]                  

  

 

                                        Automated blood eosinophil count as percentage of total leukocytes              

                                        Automated blood eosinophil count as percentage of total leukocytes   

                          1.7                              0.0 - 6.0                  

                    2018                                                                   

                                        [facility]                    

 

                                        Automated blood lymphocyte count as percentage ot total leukocytes              

                                        Automated blood lymphocyte count as percentage ot total leukocytes   

                          18.3                              18.0 - 39.1               

                    2018                                                                

                                        [facility]                    

 

                                        Automated blood monocyte count as percentage of total leukocytes                

                                        Automated blood monocyte count as percentage of total leukocytes       

                          9.8                              4.4 - 11.3                     

                    2018                                                                      

                                        [facility]                    

 

                          Automated blood neutrophil count                            Automated blood neutrophil

 count                            9.2                              2.1 - 6.9         

                    2018                                                          

                                        [facility]                  

  

 

                          Automated blood platelet count (count/volume)                            Automated

 blood platelet count (count/volume)                            376                

                          140 - 360                            2018                        

                                                                            [facility]                    

 

                                        Automated blood segmented neutrophil count as percentage of total leukocytes    

                                        Automated blood segmented neutrophil count as percentage of

 total leukocytes                            69.1                              38.7 -

 80.0                            2018                                          

                                                      [facility]    

                

 

                                        Automated erythrocyte mean corpuscular hemoglobin (mass per erythrocyte)        

                                        Automated erythrocyte mean corpuscular hemoglobin (mass per erythrocyte)

                            27.3                              28 - 32                

                    2018                                                                 

                                        [facility]                    

 

                                        Automated erythrocyte mean corpuscular hemoglobin concentration measurement (mass/volume)

                                        Automated erythrocyte mean corpuscular hemoglobin concentration

 measurement (mass/volume)                            34.2                         

                    31 - 35                            2018                                     

                                                      [facility]

                    

 

                          Automated erythrocyte mean corpuscular volume                            Automated

 erythrocyte mean corpuscular volume                            79.7               

                          81 - 99                            2018                         

                                                                            [facility]                    

 

                          Blood erythrocytes automated count (number/volume)                            Blood

 erythrocytes automated count (number/volume)                            3.74      

                          3.6 - 5.1                            2018              

                                                                            [facility]                    

 

                          Blood leukocytes automated count (number/volume)                            Blood 

leukocytes automated count (number/volume)                            13.31        

                          4.8 - 10.8                            2018               

                                                                            [facility]                    

 

                          Blood lymphocytes count (number/volume)                            Blood lymphocytes

 count (number/volume)                            2.4                              1.0

 - 3.2                            2018                                         

                                                      [facility]   

                 

 

                          Blood monocytes automated count (number/volume)                            Blood monocytes

 automated count (number/volume)                            1.3                    

                    0.2 - 0.8                            2018                              

                                                      [facility]                    

 

                                        Estimated glomerular filtration rate (GFR) determination                        

                                        Estimated glomerular filtration rate (GFR) determination                       

                    null                            60                            2018          

                                                                            [facility]                    

 

                          Glucose measurement                            Glucose measurement                

                    92                              74 - 118                            2018

                                                                                    [facility]                    

 

                          Plasma globulin measurement (mass/volume)                            Plasma globulin

 measurement (mass/volume)                            3.3                          

                    2.3 - 3.5                            2018                                    

                                                      [facility]

                    

 

                                        Serum or plasma alanine aminotransferase measurement (enzymatic activity/volume)

                                        Serum or plasma alanine aminotransferase measurement (enzymatic

 activity/volume)                            21                              0 - 55  

                          2018                                                 

                                                      [facility]           

         

 

                          Serum or plasma albumin measurement (mass/volume)                            Serum

 or plasma albumin measurement (mass/volume)                            2.5        

                          3.5 - 5.0                            2018                

                                                                            [facility]                    

 

                          Serum or plasma albumin/globulin mass ratio                            Serum or plasma

 albumin/globulin mass ratio                            0.8                        

                    0.8 - 2.0                            2018                                  

                                                      [facility]

                    

 

                                        Serum or plasma alkaline phosphatase measurement (enzymatic activity/volume)    

                                        Serum or plasma alkaline phosphatase measurement (enzymatic

 activity/volume)                            56                              40 - 150

                            2018                                               

                                                      [facility]         

           

 

                          Serum or plasma anion gap                            Serum or plasma anion gap    

                          13.3                              8 - 16                     

                    2018                                                                      

                                        [facility]                    

 

                          Serum or plasma calcium measurement (mass/volume)                            Serum

 or plasma calcium measurement (mass/volume)                            8.5        

                          8.4 - 10.2                            2018               

                                                                            [facility]                    

 

                                        Serum or plasma carbon dioxide, total measurement (moles/volume)                

                                        Serum or plasma carbon dioxide, total measurement (moles/volume)       

                          22                              22 - 29                         

                    2018                                                                          

                                        [facility]                    

 

                          Serum or plasma chloride measurement (moles/volume)                            Serum

 or plasma chloride measurement (moles/volume)                            102      

                          98 - 107                            2018               

                                                                            [facility]                    

 

                          Serum or plasma creatinine measurement (mass/volume)                            Serum

 or plasma creatinine measurement (mass/volume)                            0.68    

                          0.57 - 1.11                            2018          

                                                                            [facility]                    

 

                          Serum or plasma protein measurement (mass/volume)                            Serum

 or plasma protein measurement (mass/volume)                            5.8        

                          6.5 - 8.1                            2018                

                                                                            [facility]                    

 

                          Serum or plasma sodium measurement (moles/volume)                            Serum

 or plasma sodium measurement (moles/volume)                            134        

                          136 - 145                            2018                

                                                                            [facility]                    

 

                                        Serum or plasma total bilirubin measurement (mass/volume)                       

                                        Serum or plasma total bilirubin measurement (mass/volume)                     

                    0.9                              0.2 - 1.2                            2018

                                                                                    [facility]                    

 

                                        Serum or plasma urea nitrogen measurement (mass/volume)                         

                                        Serum or plasma urea nitrogen measurement (mass/volume)                         

                    6                              7 - 26                            2018         

                                                                            [facility]                    

 

                          Serum or plasma urea nitrogen/creatinine mass ratio                            Serum

 or plasma urea nitrogen/creatinine mass ratio                            9        

                          6 - 25                            2018                   

                                                                            [facility]                    

 

                                                Red Cell Distribution Width                            16.2

                              11.7 - 14.4                            2018      

                                                                              [facility]                    

 

                                                IM GRANULOCYTES %                            0.6        

                          0.0 - 1.0                            2018                

                                                                            [facility]                    

 

                                                      Absolute Immature Granulocyte (auto                   

                    0.08                              0 - 0.1                            2018

                                                                                    [facility]                    

 

                                                      Aspartate Amino Transf (AST/SGOT)                     

                    16                              5 - 34                            2018    

                                                                                [facility]                    

 

                          Erythrocyte sedimentation rate by Westergren method                            Erythrocyte

 sedimentation rate by Westergren method                            70             

                          0 - 20                            2018                        

                                                                            [facility]                    

 

                                        Serum or plasma C reactive protein measurement (mass/volume)                    

                                        Serum or plasma C reactive protein measurement (mass/volume)               

                    52.3                              0.0 - 4.9                            2018

                                                                                    [facility]                    

 

                                        Automated urine sediment leukocyte count by microscopy (number/high power field)

                                        Automated urine sediment leukocyte count by microscopy 

(number/high power field)                            null                            

0 - 5                            2018                                          

                                                      [facility]    

                

 

                                        Bacteria detection in urine sediment by light microscopy                        

                                        Bacteria detection in urine sediment by light microscopy                       

                    FEW                              NONE                            2018       

                                                                             [facility]                    

 

                          Clostridium difficile A and B toxin assay                            Clostridium difficile

 A and B toxin assay                            NEGATIVE                           

                    NEGATIVE                            2018                                      

                                                      [facility]

                    

 

                                        Epithelial cells detection in urine sediment by light microscopy                

                                        Epithelial cells detection in urine sediment by light microscopy       

                     FEW                              NONE                            2018

                                                                                    [facility]                    

 

                                        Erythrocytes detection in urine sediment by light microscopy                    

                                        Erythrocytes detection in urine sediment by light microscopy               

                    null                            0 - 5                            2018

                                                                                    [facility]                    

 

                          Specific gravity of Urine by Test strip                            Specific gravity

 of Urine by Test strip                            1.015                           

                    1.010 - 1.025                            2018                                 

                                                      [facility]

                    

 

                          Stool lactoferrin detection                            Stool lactoferrin detection

                            POSITIVE                              NEGATIVE           

                    2018                                                            

                                        [facility]                    



 

                                        Transitional cells detection in urine sediment by light microscopy              

                                        Transitional cells detection in urine sediment by light microscopy   

                          FEW                              NONE                       

                    2018                                                                        

                                        [facility]                    

 

                    Urine clarity                            Urine clarity                            CLEAR

                              CLEAR                            2018            

                                                                            [facility]                    

 

                          Urine color determination                            Urine color determination    

                          YELLOW                              YELLOW                   

                    2018                                                                    

                                        [facility]                    

 

                          Urine erythrocytes detection                            Urine erythrocytes detection

                            4+                              NEGATIVE                 

                    2018                                                                  

                                        [facility]                    

 

                          Urine glucose detection                            Urine glucose detection        

                          NEGATIVE                              NEGATIVE                   

                    2018                                                                    

                                        [facility]                    

 

                          Urine ketones detection by automated test strip                            Urine ketones

 detection by automated test strip                            TRACE                

                          NEGATIVE                            2018                         

                                                                            [facility]                    

 

                          Urine leukocyte esterase detection by dipstick                            Urine leukocyte

 esterase detection by dipstick                            NEGATIVE                

                          NEGATIVE                            2018                         

                                                                            [facility]                    

 

                          Urine nitrite detection                            Urine nitrite detection        

                          NEGATIVE                              NEGATIVE                   

                    2018                                                                    

                                        [facility]                    

 

                          Urine pH measurement by automated test strip                            Urine pH measurement

 by automated test strip                            6                              5 

- 7                            2018                                            

                                                      [facility]      

              

 

                          Urine protein measurement by test strip (mass/volume)                            Urine

 protein measurement by test strip (mass/volume)                            TRACE  

                            NEGATIVE                            2018           

                                                                            [facility]                    

 

                          Urine total bilirubin measurement (mass/volume)                            Urine total

 bilirubin measurement (mass/volume)                            NEGATIVE           

                          NEGATIVE                            2018                    

                                                                            [facility]                    

 

                                        Urine urobilinogen measurement by test strip (mass/volume)                      

                                        Urine urobilinogen measurement by test strip (mass/volume)                   

                    0.2                              0.2 - 1                            2018

                                                                                    [facility]                    

 

                          Serum or plasma ferritin measurement (mass/volume)                            Serum

 or plasma ferritin measurement (mass/volume)                            513.46    

                          4.63 - 204.00                            2018        

                                                                            [facility]                    

 

                                        Serum or plasma iron binding capacity measurement (mass/volume)                 

                                        Serum or plasma iron binding capacity measurement (mass/volume)         

                          190                              261 - 478                        

                    2018                                                                         

                                        [facility]                    

 

                          Serum or plasma iron measurement (mass/volume)                            Serum or

 plasma iron measurement (mass/volume)                            15               

                          50 - 170                            2018                        

                                                                            [facility]                    

 

                                        Serum or plasma iron saturation measurement (mass fraction)                     

                                        Serum or plasma iron saturation measurement (mass fraction)                 

                    8                              15 - 50                            2018

                                                                                    [facility]                    

 

                          Serum or plasma transferrin measurement (mass/volume)                            Serum

 or plasma transferrin measurement (mass/volume)                            136    

                          180 - 382                            2018            

                                                                            [facility]                    

 

                          Stool gastrointestinal hemoglobin detection                            Stool gastrointestinal

 hemoglobin detection                            POSITIVE                          

                    NEGATIVE                            2018                                     

                                                      [facility]

                    

 

                                        Serum or plasma cholesterol in HDL measurement (mass/volume)                    

                                        Serum or plasma cholesterol in HDL measurement (mass/volume)               

                    39                              40 - 60                            2018

                                                                                    [facility]                    

 

                                        Serum or plasma cholesterol in LDL measurement (mass/volume)                    

                                        Serum or plasma cholesterol in LDL measurement (mass/volume)               

                    66                              60 - 130                            2018

                                                                                    [facility]                    

 

                          Serum or plasma cholesterol measurement (mass/volume)                            Serum

 or plasma cholesterol measurement (mass/volume)                            129    

                          0 - 199                            2018              

                                                                            [facility]                    

 

                                        Serum or plasma creatine kinase MB measurement (mass/volume)                    

                                        Serum or plasma creatine kinase MB measurement (mass/volume)               

                    3.30                              0 - 5.0                            2018

                                                                                    [facility]                    

 

                                        Serum or plasma creatine kinase measurement (enzymatic activity/volume)         

                                        Serum or plasma creatine kinase measurement (enzymatic activity/volume)

                            59                              29 - 168                 

                    2018                                                                  

                                        [facility]                    

 

                                        Serum or plasma thyrotropin measurement by detection limit <=0.005 miu/l (units/volume)

                                        Serum or plasma thyrotropin measurement by detection limit

 <=0.005 miu/l (units/volume)                            0.846                     

                          0.350 - 4.940                            2018                         

                                                                            [facility]                    

 

                                        Serum or plasma total cholesterol/cholesterol in HDL mass ratio                 

                                        Serum or plasma total cholesterol/cholesterol in HDL mass ratio         

                          3.3                              3.0 - 3.6                        

                    2018                                                                         

                                        [facility]                    

 

                          Serum or plasma triglyceride measurement (mass/volume)                            

Serum or plasma triglyceride measurement (mass/volume)                            118

                              0 - 149                            2018          

                                                                            [facility]                    

 

                                        Troponin I measurement by highly sensitive enzyme immunoassay                   

                                        Troponin I measurement by highly sensitive enzyme immunoassay             

                    0.349                              0 - 0.300                            

2018                                                                           

                                        [facility]                    

 

                                                Hemoglobin A1c Percent                            5.6   

                           4.0 - 7.0                            2018           

                                                                            [facility]                    

 

                                        Serum or plasma conjugated bilirubin measurement (mass/volume)                  

                                        Serum or plasma conjugated bilirubin measurement (mass/volume)           

                    0.5                              0.0 - 0.5                            

2018                                                                           

                                        [facility]                    



                                                                                
                                                                                
                                                                                
                                                                                
                                                                                
                                                                                
                                                                                
                                                                                
                                                                                
                                                                                
                                                                                
                                                                                
                                                                                
                                                                                
                                                                                
                                                                                
                                                                



Vital Signs

                    





                    Vital Sign                            Value                            Date         

                          Comments                            Source                    



                                                                        



Encounters

                    





                    Location                            Location Details                            Encounter

 Type                            Encounter Number                            Reason For

 Visit                            Attending Provider                            ADM Date

                            DC Date                            Status                

                                        Source                    

 

                                                                            Discharged Inpatient        

                          [id number]                                                     

                    KATIE LIANG MD                            2018

                                                        [facility]                    



                                                                                
   



Procedures

                    





                    Procedure                            Code                            Date           

                          Perfomer                            Comments                        

                                        Source                    

 

                    EGD with biopsy                            [id number]                            2018

                            [facility]                                                   

                                        [facility]

## 2019-06-12 NOTE — XMS REPORT
Clinical Summary

                             Created on: 2019



Viky Ho

External Reference #: [id number]

: 1955

Sex: Female



Demographics







                          Address                   [address] [place]  [address]

 

                          Home Phone                [phone]

 

                          Preferred Language        English

 

                          Marital Status            Unknown

 

                          Hoahaoism Affiliation     Unknown

 

                          Race                      Unknown

 

                          Ethnic Group              Unknown





Author







                          Author                    [facility]

 

                          Organization              [facility]

 

                          Address                   Unknown

 

                          Phone                     Unavailable







Care Team Providers







                    Care Team Member Name    Role                Phone

 

                          PCP                       Unavailable







Allergies

Not on File



Medications

Not on file



Active Problems





Not on file



Encounters







                          Care Team                 Description



                     Date                Type                Specialty  

 

                                                     



                     2018          Clinical            [facility]  



                                         Support   



after 2018



Immunizations







  



                     Name                Dates Previously Given     Next Due

 

  



                           FLUCELVAX QUAD PF (0.5mL     2018 



                                         syringe)  







Social History







                                        Date



                 Tobacco Use     Types           Packs/Day       Years Used 

 

                                         



                                         Never Assessed    









 



                           Sex Assigned at Birth     Date Recorded

 

 



                                         Not on file 









                                        Industry



                           Job Start Date            Occupation 

 

                                        Not on file



                           Not on file               Not on file 









                                        Travel End



                           Travel History            Travel Start 

 





                                         No recent travel history available.







Last Filed Vital Signs

Not on file



Plan of Treatment







   



                 Health Maintenance     Due Date        Last Done       Comments

 

   



                           BREAST CANCER SCREENING     2007  

 

   



                           COLONOSCOPY SCREENING     2007  

 

   



                           SHINGLES VACCINES (#1)     2007  

 

   



                     INFLUENZA VACCINE     2019, 10/19/2015 







Results

Not on fileafter 2018



Insurance







                                        Type



            Payer      Benefit     Subscriber ID     Effective     Phone      Address 



                           Plan /                    Dates   



                                         Group     

 

                                        HMO/PPO



                 [facility]      xxxxxxxxx       2016-P   



                           THCARE                    resent   



                                         CHOICE/CHO     



                                         ICE +     









     



            Guarantor Name     Account     Relation to     Date of     Phone      Billing Address



                     Type                Patient             Birth  

 

     



            Viky Ho     Personal/F     Self       1955     [phone]     [address] [facility]               ([place])              [place] [address]







Advance Directives





Patient has advance care planning documents on file. For more information, del
e contact:



Alex Flores



[address] [place] [address]

## 2019-06-12 NOTE — XMS REPORT
Clinical Summary

                             Created on: 2019



Viky Ho

External Reference #: [id number]

: 1955

Sex: Female



Demographics







                          Address                   [address] [place] [address]  [address]

 

                          Home Phone                [phone]

 

                          Preferred Language        English

 

                          Marital Status            Unknown

 

                          Judaism Affiliation     Unknown

 

                          Race                      Unknown

 

                          Ethnic Group              Non-





Author







                          Author                    [facility]

 

                          Address                   Unknown

 

                          Phone                     Unavailable







Support







                Name            Relationship    Address         Phone

 

                julianna royal    [employer]            Unknown         [phone]







Care Team Providers







                    Care Team Member Name    Role                Phone

 

                          PCP                       Unavailable







Allergies







                                        Comments



                 Active Allergy     Reactions       Severity        Noted Date 

 

                                        



States ensymes high



                     Atorvastatin        Other (See          10/19/2009 



                                         Comments)   

 

                                        



Stomach pain 



                     Iron                Other (See          2019 



                                         Comments)   







Medications







                          End Date                  Status



              Medication     Sig          Dispensed     Refills      Start  



                                         Date  

 

                                                    Active



                 AMLODIPINE BESYLATE,     Take 5 mg by      0                 



                     BULK, MISC          mouth daily.        8  

 

                                                    Active



                     aspirin 81 MG EC tablet     Take 81 mg by       0   



                                         mouth daily.     

 

                                                    Active



                     atenolol (TENORMIN) 50 MG     Take 50 mg by       0   



                           tablet                    mouth daily.     

 

                                                    Active



                 losartan-hydrochlorothiaz     Take 100 mg      0                 



                     sukhwinder (HYZAAR) 100-12.5 mg     by mouth            8  



                           per tablet                daily.     

 

                                                    Active



                 lisinopril      Take 20 mg by      0                 



                     (PRINIVIL,ZESTRIL) 10 MG     mouth 2 (two)       8  



                           tablet                    times daily.     

 

                                                    Active



                 lovastatin (MEVACOR) 20     Take 20 mg by      0                 



                     MG tablet           mouth daily.        8  

 

                                                    Active



                 pantoprazole (PROTONIX)     Take 40 mg by      0                 



                     40 MG tablet        mouth daily.        8  

 

                                                    Active



                 sucralfate (CARAFATE) 1     Take 1 g by      0                 



                     gram tablet         mouth daily.        8  

 

                                                    Active



                 vitamin E 400 UNIT     Take 400 mg      0               10/29/201  



                     capsule             by mouth            0  



                                         daily.     

 

                                                    Active



                 dicyclomine (BENTYL) 20     Take 20 mg by      0                 



                     mg tablet           mouth daily.        9  

 

                                                    Active



                     colestipol (COLESTID) 5     Take 4 g by         0   



                           gram granules             mouth 2 (two)     



                                         times daily.     

 

                                                    Active



                     famotidine (PEPCID) 20 MG     Take 40 mg by       0   



                           tablet                    mouth     



                                         nightly.     

 

                                                    Active



                     Lactobac no.41/Bifidobact     Take 1 tablet       0   



                           no.7 (PROBIOTIC-10 ORAL)     by mouth.     

 

                                                    Active



                     omega 3-dha-epa-fish oil     Take 1,000 mg       0   



                           (FISH OIL) 1,000 mg (120     by mouth     



                           mg-180 mg) Cap            daily.     

 

                                                    Active



                     cholecalciferol (VITAMIN     Take 1,000          0   



                           D3) 1,000 unit tablet     Units by     



                                         mouth daily.     









                                        Status



            Hospital, Clinic, or     Ordered Dose     Route      Frequency     Start      End Date 



                           Other Facility            Date  



                                         Administered Medication      

 

                                        Ended



            acetaminophen (TYLENOL)     650 mg     Oral       Once       20 



                     tablet 650 mg        19                  9 

 

                                        Ended



            heparin (PF) injection     500 Units     IV         Once as needed     20

 



                     syringe 500 Units        19                  9 

 

                                        Ended



            diphenhydrAMINE     25 mg      IV         Once       20 



                     (BENADRYL) injection 25        19                  9 



                                         mg      

 

                                        Ended



              sodium chloride 0.9% (NS)      IV           Once         20 



                     infusion            19                  9 

 

                                        Ended



              sodium chloride 0.9% (NS)      IV           Once         20 



                     infusion            19                  9 







Active Problems





Not on file



Encounters







                          Care Team                 Description



                     Date                Type                Specialty  

 

                                        



Alondra Ruff MD Marcus, Shannon, RN                     Iron deficiency anemia secondary to inadequate dietary iron 

intake (Primary Dx)



                     2019          Procedure visit     Oncology  

 

                                        



Alondra Ruff MD                        Iron deficiency anemia, unspecified iron deficiency anemia type

 (Primary Dx)



                     2019          Procedure visit     Oncology  



after 2018



Social History







                                        Date



                 Tobacco Use     Types           Packs/Day       Years Used 

 

                                         



                                         Never Assessed    









 



                           Sex Assigned at Birth     Date Recorded

 

 



                                         Not on file 









                                        Industry



                           Job Start Date            Occupation 

 

                                        Not on file



                           Not on file               Not on file 









                                        Travel End



                           Travel History            Travel Start 

 





                                         No recent travel history available.







Last Filed Vital Signs







                                        Time Taken



                           Vital Sign                Reading 

 

                                        2019  4:35 PM CDT



                           Blood Pressure            129/56 

 

                                        2019  4:35 PM CDT



                           Pulse                     53 

 

                                        2019  4:35 PM CDT



                           Temperature               36.6 C (97.9 F) 

 

                                        2019  4:35 PM CDT



                           Respiratory Rate          16 

 

                                        2019  4:35 PM CDT



                           Oxygen Saturation         100% 

 

                                        -



                           Inhaled Oxygen            - 



                                         Concentration  

 

                                        -



                           Weight                    - 

 

                                        -



                           Height                    - 

 

                                        -



                           Body Mass Index           - 







Plan of Treatment





Not on file



Procedures







                                        Comments



                 Procedure Name     Priority        Date/Time       Associated Diagnosis 

 

                                         



                           TRANSFUSION SERVICE       2019  



                           REPORT - SCAN             6:03 PM CDT  

 

                                        



Results for this procedure are in the results section.



                 PREPARE LEUKO-REDUCED RBC     Routine         2019      Iron deficiency anemia 



                           11:54 PM CDT              secondary to inadequate 



                                         dietary iron intake 

 

                                         



                           TRANSFUSION SERVICE       2019  



                           REPORT - SCAN             6:03 PM CDT  

 

                                         



                           TRANSFUSION SERVICE       2019  



                           REPORT - SCAN             6:03 PM CDT  

 

                                         



                 TRANSFUSE LEUKO-REDUCED     Routine         2019      Iron deficiency anemia 



                     RED BLOOD CELLS      3:35 PM CDT         secondary to inadequate 



                                         dietary iron intake 

 

                                         



                 TRANSFUSE LEUKO-REDUCED     Routine         2019      Iron deficiency anemia 



                     RED BLOOD CELLS      12:37 PM CDT        secondary to inadequate 



                                         dietary iron intake 

 

                                        



Results for this procedure are in the results section.



                 TYPE AND SCREEN,     Routine         2019      Iron deficiency anemia, 



                     AUTOMATED           1:40 PM CDT         unspecified iron 



                                         deficiency anemia type 



after 2018



Results

* TRANSFUSION SERVICE REPORT - SCAN (2019  6:03 PM CDT)



Only the most recent of 3 results within the time period is included.





 



                           Narrative                 Performed At

 

 



                                         This result has an attachment that is not available. 





* Prepare Leuko-Red RBC (2019 11:54 PM CDT)





   



                 Component       Value           Ref Range       Performed At

 

   



                     Unit ABO            O Pos               [facility]

 

   



                     UNIT NUMBER         [id number]       [facility]

 

   



                     Status              TX_TIMEINCHART      [facility]

 

   



                     Blood Bank Product     RED BLOOD CELLS      [facility]

 

   



                     PRODUCT CODE        [id number]            [facility]

 

   



                     Unit ABO            O Pos               [facility]

 

   



                     UNIT NUMBER         [id number]       [facility]

 

   



                     Status              TX_TIMEINCHART      [facility]

 

   



                     Blood Bank Product     RED BLOOD CELLS      [facility]

 

   



                     PRODUCT CODE        [id number]            [facility]

 

   



                     CROSSMATCH          COMPATIBLE          [facility]

 

   



                     CROSSMATCH          COMPATIBLE          [facility]













                                         Specimen

 





                                         Other









   



                 Performing Organization     Address         City/[facility]     Phone Number

 

   



                                         [facility]   





* Transfuse Leuko-Red RBC (2019  3:35 PM CDT)



Only the most recent of 3 results within the time period is included.

* Type and screen, automated (2019  1:40 PM CDT)





   



                 Component       Value           Ref Range       Performed At

 

   



                     ABO/RH AUTOMATED (BEAKER)     A POSITIVE          [facility]

 

   



                     Ab Scrn             NEGATIVE            [facility]













                                         Specimen

 





                                         Blood









   



                 Performing Organization     Address         City/[facility]/Zipcode     Phone Number

 

   



                 [facility]     [address] [place] [address] [phone]



                                         MEDICAL CENTER   





after 2018



Insurance







     



            Payer      Benefit     Subscriber ID     Type       Phone      Address



                                         Plan /    



                                         Group    

 

     



                 [facility]     [employer]      xxxxxxxxx       HMO/POS  



                           CARE                      POS SELECT    



                                         CHOICE    









     



            Guarantor Name     Account     Relation to     Date of     Phone      Billing Address



                     Type                Patient             Birth  

 

     



            HoViky     Personal/F     Self       1955     [phone]     [address] [facility]               ([place])              [place] [address]

## 2019-06-12 NOTE — OPERATIVE REPORT
DATE OF PROCEDURE:  06/12/2019

 

SURGEON:  Manoj Sanchez MD

 

INDICATIONS FOR PROCEDURE:  Iron-deficiency anemia, diarrhea, status post small bowel

resection for ischemic bowel. 

 

PROCEDURE:  Enteroscopy with biopsies and fulguration of arteriovenous malformation with

APC probe. 

 

MEDICATIONS:  The patient was done under MAC, please see anesthesiologist's note.

 

PROCEDURE IN DETAIL:  With the patient in left lateral decubitus position, a flexible

fiberoptic pediatric colonoscope was inserted into the esophagus under direct

visualization without any difficulty.  The esophagus appeared to be within normal

limits.  The scope was then advanced with ease into the stomach and multiple gastric

ulcers were noted in the antrum as well as the body.  There was no active bleeding.  ?

two AVMs were noted in the fundus of the stomach and those were fulgurated with the APC

probe.  The pylorus was then intubated and the scope was advanced all the way to

approximately 160 cm from the incisors.  Scattered ulcerations were noted throughout the

duodenum and the proximal jejunum.  There was no active bleeding noted.  One ulcer in

the jejunum was biopsied.  ? AVMs were noted, one in the jejunum and one in the duodenum

and those were fulgurated with the APC probe.  The scope was subsequently withdrawn.

The patient tolerated procedure well. 

 

IMPRESSION:  

1. Enteroscopy to approximately 160 cm from the incisors.

2. Normal esophagus.

3. Gastric ulcers, multiple, body and antrum without active bleeding.

4. ? arteriovenous malformations, fundus, fulgurated with APC probe.

5. Scattered duodenal and jejunal ulcers without active bleeding.  One jejunal ulcer

biopsied. 

6. ? arteriovenous malformations duodenum and jejunal fulgurated with APC probe.

 

PLAN:  Follow up histology.  Increase Carafate to 2 g p.o. a.c. t.i.d. and q.h.s.  Start

Dexilant 60 mg one p.o. a.c. b.i.d.  Check gastrin level.  IBD panel, CRP, and

sedimentation rate.  Consider medium chain triglyceride supplements and low-fat diet.

If the patient is no better after one week of therapy, we will consider admission for

TPN. 

 

 

 

 

______________________________

Manoj Sanchez MD

 

[facility]

D:  06/12/2019 09:19:06

T:  06/12/2019 15:22:53

Job #:  [id number]

 

cc:            Elizabeth Sharpe MD

## 2019-10-14 ENCOUNTER — HOSPITAL ENCOUNTER (INPATIENT)
Dept: HOSPITAL 88 - ER | Age: 64
LOS: 8 days | Discharge: HOME | DRG: 853 | End: 2019-10-22
Attending: SURGERY | Admitting: SURGERY
Payer: COMMERCIAL

## 2019-10-14 VITALS — HEIGHT: 63 IN | WEIGHT: 133 LBS | BODY MASS INDEX: 23.57 KG/M2

## 2019-10-14 VITALS — DIASTOLIC BLOOD PRESSURE: 39 MMHG | SYSTOLIC BLOOD PRESSURE: 102 MMHG

## 2019-10-14 VITALS — SYSTOLIC BLOOD PRESSURE: 94 MMHG | DIASTOLIC BLOOD PRESSURE: 33 MMHG

## 2019-10-14 VITALS — SYSTOLIC BLOOD PRESSURE: 107 MMHG | DIASTOLIC BLOOD PRESSURE: 39 MMHG

## 2019-10-14 VITALS — DIASTOLIC BLOOD PRESSURE: 39 MMHG | SYSTOLIC BLOOD PRESSURE: 114 MMHG

## 2019-10-14 VITALS — DIASTOLIC BLOOD PRESSURE: 30 MMHG | SYSTOLIC BLOOD PRESSURE: 84 MMHG

## 2019-10-14 VITALS — DIASTOLIC BLOOD PRESSURE: 41 MMHG | SYSTOLIC BLOOD PRESSURE: 98 MMHG

## 2019-10-14 VITALS — DIASTOLIC BLOOD PRESSURE: 43 MMHG | SYSTOLIC BLOOD PRESSURE: 107 MMHG

## 2019-10-14 VITALS — DIASTOLIC BLOOD PRESSURE: 46 MMHG | SYSTOLIC BLOOD PRESSURE: 96 MMHG

## 2019-10-14 VITALS — SYSTOLIC BLOOD PRESSURE: 95 MMHG | DIASTOLIC BLOOD PRESSURE: 33 MMHG

## 2019-10-14 DIAGNOSIS — K63.1: ICD-10-CM

## 2019-10-14 DIAGNOSIS — K65.8: ICD-10-CM

## 2019-10-14 DIAGNOSIS — R94.39: ICD-10-CM

## 2019-10-14 DIAGNOSIS — I13.0: ICD-10-CM

## 2019-10-14 DIAGNOSIS — R65.20: ICD-10-CM

## 2019-10-14 DIAGNOSIS — Z82.49: ICD-10-CM

## 2019-10-14 DIAGNOSIS — Z88.8: ICD-10-CM

## 2019-10-14 DIAGNOSIS — D62: ICD-10-CM

## 2019-10-14 DIAGNOSIS — K56.7: ICD-10-CM

## 2019-10-14 DIAGNOSIS — N18.9: ICD-10-CM

## 2019-10-14 DIAGNOSIS — Z91.048: ICD-10-CM

## 2019-10-14 DIAGNOSIS — A41.9: Primary | ICD-10-CM

## 2019-10-14 DIAGNOSIS — E87.2: ICD-10-CM

## 2019-10-14 DIAGNOSIS — D50.9: ICD-10-CM

## 2019-10-14 DIAGNOSIS — I25.10: ICD-10-CM

## 2019-10-14 DIAGNOSIS — I50.32: ICD-10-CM

## 2019-10-14 DIAGNOSIS — K58.9: ICD-10-CM

## 2019-10-14 DIAGNOSIS — K91.2: ICD-10-CM

## 2019-10-14 DIAGNOSIS — E11.22: ICD-10-CM

## 2019-10-14 DIAGNOSIS — N17.0: ICD-10-CM

## 2019-10-14 DIAGNOSIS — E44.1: ICD-10-CM

## 2019-10-14 DIAGNOSIS — I35.0: ICD-10-CM

## 2019-10-14 LAB
ALBUMIN SERPL-MCNC: 3.8 G/DL (ref 3.5–5)
ALBUMIN/GLOB SERPL: 0.9 {RATIO} (ref 0.8–2)
ALP SERPL-CCNC: 69 IU/L (ref 40–150)
ALT SERPL-CCNC: 10 IU/L (ref 0–55)
AMYLASE SERPL-CCNC: 109 U/L (ref 25–125)
ANION GAP SERPL CALC-SCNC: 15.4 MMOL/L (ref 8–16)
BACTERIA URNS QL MICRO: (no result) /HPF
BASOPHILS # BLD AUTO: 0.1 10*3/UL (ref 0–0.1)
BASOPHILS NFR BLD AUTO: 0.3 % (ref 0–1)
BILIRUB UR QL: NEGATIVE
BUN SERPL-MCNC: 35 MG/DL (ref 7–26)
BUN/CREAT SERPL: 32 (ref 6–25)
CALCIUM SERPL-MCNC: 9.6 MG/DL (ref 8.4–10.2)
CHLORIDE SERPL-SCNC: 106 MMOL/L (ref 98–107)
CK MB SERPL-MCNC: 1.1 NG/ML (ref 0–5)
CK SERPL-CCNC: 51 IU/L (ref 29–168)
CLARITY UR: (no result)
CO2 SERPL-SCNC: 21 MMOL/L (ref 22–29)
COLOR UR: YELLOW
DEPRECATED APTT PLAS QN: 29.1 SECONDS (ref 23.8–35.5)
DEPRECATED INR PLAS: 1.08
DEPRECATED NEUTROPHILS # BLD AUTO: 16.1 10*3/UL (ref 2.1–6.9)
DEPRECATED RBC URNS MANUAL-ACNC: (no result) /HPF (ref 0–5)
EGFRCR SERPLBLD CKD-EPI 2021: 50 ML/MIN (ref 60–?)
EOSINOPHIL # BLD AUTO: 0.1 10*3/UL (ref 0–0.4)
EOSINOPHIL NFR BLD AUTO: 0.6 % (ref 0–6)
EPI CELLS URNS QL MICRO: (no result) /LPF
ERYTHROCYTE [DISTWIDTH] IN CORD BLOOD: 13.2 % (ref 11.7–14.4)
GLOBULIN PLAS-MCNC: 4.2 G/DL (ref 2.3–3.5)
GLUCOSE SERPLBLD-MCNC: 141 MG/DL (ref 74–118)
HCT VFR BLD AUTO: 35.7 % (ref 34.2–44.1)
HGB BLD-MCNC: 11.1 G/DL (ref 12–16)
KETONES UR QL STRIP.AUTO: NEGATIVE
LEUKOCYTE ESTERASE UR QL STRIP.AUTO: NEGATIVE
LIPASE SERPL-CCNC: 26 U/L (ref 8–78)
LYMPHOCYTES # BLD: 0.3 10*3/UL (ref 1–3.2)
LYMPHOCYTES NFR BLD AUTO: 1.7 % (ref 18–39.1)
MCH RBC QN AUTO: 28.8 PG (ref 28–32)
MCHC RBC AUTO-ENTMCNC: 31.1 G/DL (ref 31–35)
MCV RBC AUTO: 92.7 FL (ref 81–99)
MONOCYTES # BLD AUTO: 0.7 10*3/UL (ref 0.2–0.8)
MONOCYTES NFR BLD AUTO: 4.2 % (ref 4.4–11.3)
NEUTS SEG NFR BLD AUTO: 92.6 % (ref 38.7–80)
NITRITE UR QL STRIP.AUTO: NEGATIVE
PLATELET # BLD AUTO: 340 X10E3/UL (ref 140–360)
POTASSIUM SERPL-SCNC: 4.4 MMOL/L (ref 3.5–5.1)
PROT UR QL STRIP.AUTO: (no result)
PROTHROMBIN TIME: 14.5 SECONDS (ref 11.9–14.5)
RBC # BLD AUTO: 3.85 X10E6/UL (ref 3.6–5.1)
SODIUM SERPL-SCNC: 138 MMOL/L (ref 136–145)
SP GR UR STRIP: 1.01 (ref 1.01–1.02)
UROBILINOGEN UR STRIP-MCNC: 0.2 MG/DL (ref 0.2–1)
WBC #/AREA URNS HPF: (no result) /HPF (ref 0–5)

## 2019-10-14 PROCEDURE — 99284 EMERGENCY DEPT VISIT MOD MDM: CPT

## 2019-10-14 PROCEDURE — 0DTK0ZZ RESECTION OF ASCENDING COLON, OPEN APPROACH: ICD-10-PCS | Performed by: SURGERY

## 2019-10-14 PROCEDURE — 80053 COMPREHEN METABOLIC PANEL: CPT

## 2019-10-14 PROCEDURE — 82553 CREATINE MB FRACTION: CPT

## 2019-10-14 PROCEDURE — 51700 IRRIGATION OF BLADDER: CPT

## 2019-10-14 PROCEDURE — 71045 X-RAY EXAM CHEST 1 VIEW: CPT

## 2019-10-14 PROCEDURE — 82607 VITAMIN B-12: CPT

## 2019-10-14 PROCEDURE — 84466 ASSAY OF TRANSFERRIN: CPT

## 2019-10-14 PROCEDURE — 80048 BASIC METABOLIC PNL TOTAL CA: CPT

## 2019-10-14 PROCEDURE — 82728 ASSAY OF FERRITIN: CPT

## 2019-10-14 PROCEDURE — 83540 ASSAY OF IRON: CPT

## 2019-10-14 PROCEDURE — 85610 PROTHROMBIN TIME: CPT

## 2019-10-14 PROCEDURE — 87186 SC STD MICRODIL/AGAR DIL: CPT

## 2019-10-14 PROCEDURE — 0DTB0ZZ RESECTION OF ILEUM, OPEN APPROACH: ICD-10-PCS | Performed by: SURGERY

## 2019-10-14 PROCEDURE — 85730 THROMBOPLASTIN TIME PARTIAL: CPT

## 2019-10-14 PROCEDURE — 84484 ASSAY OF TROPONIN QUANT: CPT

## 2019-10-14 PROCEDURE — 85045 AUTOMATED RETICULOCYTE COUNT: CPT

## 2019-10-14 PROCEDURE — 84443 ASSAY THYROID STIM HORMONE: CPT

## 2019-10-14 PROCEDURE — 87071 CULTURE AEROBIC QUANT OTHER: CPT

## 2019-10-14 PROCEDURE — 85025 COMPLETE CBC W/AUTO DIFF WBC: CPT

## 2019-10-14 PROCEDURE — 97139 UNLISTED THERAPEUTIC PX: CPT

## 2019-10-14 PROCEDURE — 87075 CULTR BACTERIA EXCEPT BLOOD: CPT

## 2019-10-14 PROCEDURE — 82550 ASSAY OF CK (CPK): CPT

## 2019-10-14 PROCEDURE — 82150 ASSAY OF AMYLASE: CPT

## 2019-10-14 PROCEDURE — 93005 ELECTROCARDIOGRAM TRACING: CPT

## 2019-10-14 PROCEDURE — 81001 URINALYSIS AUTO W/SCOPE: CPT

## 2019-10-14 PROCEDURE — 36415 COLL VENOUS BLD VENIPUNCTURE: CPT

## 2019-10-14 PROCEDURE — 87205 SMEAR GRAM STAIN: CPT

## 2019-10-14 PROCEDURE — 88307 TISSUE EXAM BY PATHOLOGIST: CPT

## 2019-10-14 PROCEDURE — 83690 ASSAY OF LIPASE: CPT

## 2019-10-14 PROCEDURE — 74019 RADEX ABDOMEN 2 VIEWS: CPT

## 2019-10-14 PROCEDURE — 74022 RADEX COMPL AQT ABD SERIES: CPT

## 2019-10-14 PROCEDURE — 83605 ASSAY OF LACTIC ACID: CPT

## 2019-10-14 PROCEDURE — 74176 CT ABD & PELVIS W/O CONTRAST: CPT

## 2019-10-14 RX ADMIN — TAZOBACTAM SODIUM AND PIPERACILLIN SODIUM SCH MLS/HR: 375; 3 INJECTION, SOLUTION INTRAVENOUS at 18:28

## 2019-10-14 RX ADMIN — SODIUM CHLORIDE SCH MG: 900 INJECTION INTRAVENOUS at 18:50

## 2019-10-14 RX ADMIN — SODIUM CHLORIDE SCH ML: 900 IRRIGANT IRRIGATION at 21:31

## 2019-10-14 RX ADMIN — ATENOLOL SCH MG: 50 TABLET ORAL at 17:00

## 2019-10-14 RX ADMIN — FAMOTIDINE SCH MG: 10 INJECTION, SOLUTION INTRAVENOUS at 17:00

## 2019-10-14 RX ADMIN — ACETAMINOPHEN PRN MG: 10 INJECTION, SOLUTION INTRAVENOUS at 20:13

## 2019-10-14 RX ADMIN — SODIUM CHLORIDE SCH ML: 900 IRRIGANT IRRIGATION at 18:50

## 2019-10-14 RX ADMIN — TAZOBACTAM SODIUM AND PIPERACILLIN SODIUM SCH MLS/HR: 375; 3 INJECTION, SOLUTION INTRAVENOUS at 23:40

## 2019-10-14 RX ADMIN — SODIUM CHLORIDE SCH MLS/HR: 9 INJECTION, SOLUTION INTRAVENOUS at 18:26

## 2019-10-14 NOTE — CONSULTATION
DATE OF CONSULTATION:  10/14/2019

 

Cardiac Consultation 

 

REASON FOR CONSULTATION:  Urgent consultation in patient with perforated small bowel and

need for urgent surgery. 

 

HISTORY OF PRESENT ILLNESS:  This is a 64-year-old lady, who is known with hypertension,

hyperlipidemia, diabetes mellitus, major GI problem was ulcer in 2018 and she had

necrotic bowel and surgery in 2018.  Following this urgent bowel surgery, the

patient continued to have diarrhea.  She lost quite a lot of weight.  However, now her

bowel movement are better and there is stool and they are still frequent and she is

followed by GI Service, Dr. Sanchez and Dr. Grossman. 

 

The patient is know to me.  Also, she had very abnormal stress test and she had left

ventricular dysfunction with ejection fraction of 35% to 40% and mild aortic stenosis.

In fact, she was maintained on therapy.  The patient came to our office with shortness

of breath and chest pain.  We evaluated her recently with noninvasive cardiac workup and

she is supposed to come back for followup. 

 

The patient yesterday developed severe abdominal pain with nausea.  Her condition

continued to deteriorate.  Her abdominal pain becomes very diffuse and very severe.  She

has been nauseated and she is retching and she has vomited twice.  She came to the

emergency room.  CAT scan showed free air under the diaphragm indicating bowel

perforation in addition to presence of fluid.  She is going to be taken to OR.  Urgent

cardiac consultation is obtained. 

 

The patient regarding her hypertension she has been on lisinopril, amlodipine, and

Lasix.  Regarding her diabetes mellitus, since she lost 100 pounds, she is not on any

medication.  The patient is maintained on several GI issue because of her loose bowel

movement and diarrhea including colestipol four tablets twice a day, dicyclomine 20 mg

four times a day, Carafate 1 g a day, Dexilant, and Zofran p.r.n. 

 

The patient's main cardiac symptoms are easy fatigability, shortness of breath on

exertion class 3.  No angina, but shortness of breath on exertion, relieved by rest.  No

orthopnea.  No paroxysmal nocturnal dyspnea. 

 

HOME MEDICATIONS:  Lisinopril 40 mg a day, amlodipine 5 mg a day, atenolol 25 mg twice a

day, Lasix 40 mg a day, aspirin 81 mg a day, lovastatin 20 mg a day, Probiotic,

colestipol, dicyclomine, Carafate, Dexilant, Zofran, vitamin D3. 

 

ALLERGIES:  LIPITOR IS STOPPED BECAUSE OF HIGH LIVER ENZYMES.

 

PAST MEDICAL HISTORY:  

1. Hypertension.

2. Diabetes mellitus, diet controlled since she lost weight.

3. Hyperlipidemia.

4. Mild aortic stenosis.

5. Proteinuria.

6. History of Crohn disease.

7. Ischemic bowel, urgent resection in 2018.

8. History of hematuria in 2018.

9. Mild aortic stenosis.

10. Abnormal stress test.

 

SOCIAL HISTORY:  She is former smoker.  She is a .  She is social alcohol

drinker.  She is single. 

 

FAMILY HISTORY:  Father  at age 65 with alcohol complication and liver disease.

Mother  in her 70s with kidney and liver disease.  She was diabetic.  Nine siblings,

few of them with diabetes.  No children. 

 

REVIEW OF SYSTEMS:

GENERAL:  Fevers, chills, weight loss. 

HEENT:  No vision problem.  No hearing problem. 

CARDIAC:  As per current history. 

PULMONARY:  As per current history. 

GI:  As per current history basically severe abdominal pain with tenderness, nausea,

vomiting, and she is still having loose bowel movement. 

HEMATOLOGY:  Easy bruising, but no bleeding. 

:  Increased frequency of urination. 

MUSCULOSKELETAL:  Back pain. 

NEUROLOGIC:  Weakness, generalized, but no localized deficit or seizure.

 

PHYSICAL EXAMINATION:

VITAL SIGNS:  Height of 5 feet 3 inches, weight of 132 pounds, blood pressure 130/80,

heart rate of 60, respiratory rate of 18. 

HEENT:  Pupils are reactive. 

NECK:  No elevation of jugular venous pulsation.  No bruit. 

CHEST:  Decreased air entry in bases and crackles. 

HEART:  Normal first and second heart sound.  Ejection systolic murmur in left sternal

border. 

ABDOMEN:  Tender and rebound.  Bowel sounds are decreased. 

EXTREMITIES:  No signs of clubbing.  No signs of deep venous thrombosis. 

NEUROLOGIC:  Awake, alert, oriented, nonfocal.

LABORATORY DATA:  Sodium of 138, potassium of 4.4, BUN of 35, creatinine of 1.1, glucose

of 141.  White blood cell count of 17.4, hemoglobin of 11.1, hematocrit 36%, platelet

count of 340,000.  CT report as per chart. 

 

IMPRESSION AND PLAN:  

1. Acute abdomen with perforated gut.

2. Hypertension and left ventricular diastolic dysfunction.

3. Aortic stenosis, mild.

4. Hypertension.

5. Abnormal stress test.

6. Diabetes mellitus.

7. Short-gut syndrome. 

 

Cardiac wise, the patient is cleared for surgery because the surgery is urgent and

needed.  Her risks are acceptable, without surgery approach she will have peritonitis

and she will succumb to her illness.  The patient will be covered with antibiotics.  She

will be given IV fluid.  We will 

observe for volume status and her condition carefully.  Case discussed and explained to

the patient.  The patient is seen in ER urgently.  Questions are answered. 

 

 

 

______________________________

MD SHANNEN Oswald/MODL

D:  10/14/2019 12:17:42

T:  10/14/2019 19:05:03

Job #:  [id number]

## 2019-10-14 NOTE — DIAGNOSTIC IMAGING REPORT
Examination: Single AP view of the chest.



COMPARISON: CT abdomen and pelvis 10/14/2019



INDICATION: Status post central line placement

    

IMPRESSION:

 

1.  Lines and Tubes: Right IJ line has distal tip projecting in the distal

SVC/cavoatrial junction. Enteric tube is noted on the left hemidiaphragm,

however, tip is not visualized.

2.  Lungs are well-inflated. Bilateral interstitial opacities extending from

the maxine associated with interstitial edema. No consolidation. No effusion.

3.  Enlarged cardiac silhouette. Central pulmonary venous congestion.  

4.  No acute bony abnormalities.



Signed by: Dr. Gavin Tovar M.D. on 10/14/2019 6:35 PM

## 2019-10-14 NOTE — HISTORY AND PHYSICAL
PRIMARY CARE PHYSICIAN:  Dr. Elizabeth Sharpe with [facility].

 

CHIEF COMPLAINT:  Abdominal pain, severe.

 

HISTORY OF PRESENT ILLNESS:  A 64-year-old female with past medical history of

hypertension, high cholesterol, CKD, CHF with history of bowel resection about a year

ago, presented with complaints of abdominal pain that started around 9 p.m. last night.

She reports having intermittent chronic abdominal pain, which had resolved about a month

ago and last night, she had a piece of steak and hamburger around 7 p.m. and at about 9

p.m., she started having abdominal pain.  The pain continued to worsen, and states she

also had nausea and one time vomiting episode.  She has a chronic diarrhea.  She denies

any fever, chills.  No hematemesis, melena, chest pain, or radiating pain to the back.

She stated overnight, her pain worsened to 9/10, so she presented to the ER for further

evaluation.  At the ER, CT scan of abdomen and pelvis was done, which showed free air

and fluid present consistent with bowel perforation.  Site of perforation is uncertain,

but there appears to be mild thickening of small bowel loops in the lower mid abdomen

and also shows large calcified uterine fibroids has been noted in the past.  We will

admit the patient for further management of bowel perforation. 

 

PAST MEDICAL HISTORY:  

1. Hypertension.

2. High cholesterol.

3. CKD.

4. CHF.

 

PAST SURGICAL HISTORY:  

1. Cholecystectomy.

2. Bowel resection a year ago.

 

FAMILY MEDICAL HISTORY:  Reports mother and father had diabetes and siblings with heart

disease. 

 

SOCIAL HISTORY:  The patient denies any tobacco, alcohol, or illicit drug use.

 

ALLERGIES:  ATORVASTATIN AND IRON.

 

REVIEW OF SYSTEMS:

GENERAL:  In no distress. 

HEENT:  No head trauma or mouth sores. 

LUNGS:  No shortness of breath or cough. 

CARDIOVASCULAR:  No chest pain or edema. 

GI:  Abdominal pain, nausea, and diarrhea. 

NEUROLOGIC:  Alert and oriented. 

MUSCULOSKELETAL:  Moves all extremities. 

SKIN:  No rash.

 

PHYSICAL EXAMINATION:

VITAL SIGNS:  Temperature is 98.7, pulse is 72, respirations 23, blood pressure is

113/43, and pulse ox is 96% on room air. 

GENERAL:  No acute distress. 

HEENT:  Normocephalic, atraumatic. 

NECK:  Supple and midline. 

LUNGS:  Clear to auscultation. 

CARDIOVASCULAR:  S1, S2 heard. 

GI:  Soft, tender to palpation. 

NEUROLOGIC:  Alert, awake, and oriented x3. 

MUSCULOSKELETAL:  Moves all extremities.  No edema noted. 

SKIN:  Dry and intact. 

PSYCHIATRIC:  Normal affect.

LABORATORY DATA:  WBC 17.43, hemoglobin 11.1, hematocrit 35.7, platelets 340.  Sodium

138, potassium 4.4, carbon dioxide 21, BUN is 35, creatinine is 1.11, estimated GFR is

50.  Lactic acid 18, AST 20, ALT 10, amylase 109, lipase 26. 

 

IMAGING:  CT abdomen results described in HPI.

 

IMPRESSION AND PLAN:  

1. Severe abdominal pain due to possible bowel perforation per imaging.  She is kept

n.p.o., IV fluids with light hydration, pain management, Zosyn for empiric antibiotic

treatment.  Surgical team consulted and planned surgery this afternoon. 

2. Leukocytosis, likely due to bowel perforation.  She is afebrile, continue IV

antibiotics and trend WBCs. 

3. Hypertension, now stable.  We will treat as needed with hydralazine IV.

4. High cholesterol.  We will hold all p.o. medications for now.

5. Chronic kidney disease.  Creatinine is 1.1.  We will continue to monitor.

6. Congestive heart failure, stable at this time.  Continue cardiac telemetry and we

will resume home medication once p.o. started.  Cardiology is on the case and has

cleared the patient for surgery. 

7. Deep vein thrombosis prophylaxis, we will hold any chemical anticoagulation due to

surgery today. 

 

 

Dictated by CHAPIN Trotter

 

______________________________

Madhuri Garibay MD

 

MY/MODL

D:  10/14/2019 14:25:50

T:  10/14/2019 20:16:11

Job #:  [id number]

## 2019-10-14 NOTE — NUR
Other than discomfort from pain, pt expressed no spiritual or emotional concerns at this 
time.



 informed pt of availability of  and how to reach , if needed.



SHYAM MILLAN



Spiritual Care Department

O: [phone]

Pager: [phone] ([phone] calling [phone])

## 2019-10-14 NOTE — DIAGNOSTIC IMAGING REPORT
CT of the abdomen and pelvis, without contrast,  10/14/2019.      

  





History: Epigastric abdominal pain.



Comparison: 3/8/2019.



Technique: Multidetector CT scanning of the abdomen and pelvis was performed

from the level of the lung bases to the inferior pubic rami without intravenous

contrast. Oral contrast was administered. Coronal and sagittal multiplanar

reformations were obtained.



RADIATION DOSE:

     Total DLP: 253 mGy*cm

     Dose modulation, iterative reconstruction, and/or weight based adjustment

of the mA/kV was utilized to reduce the radiation dose to as low as reasonably

achievable. 



Discussion: Examination is limited without contrast.

Lung bases: There is bibasilar atelectasis.



Abdomen: Free air and fluid are present anteriorly within the abdomen. Oral

contrast is seen within the abdomen and small bowel down to the mid abdomen. No

oral contrast is seen extraluminally. There are no dilated small bowel loops

but there appears to be thickening of small bowel loops in the lower mid

abdomen. The mid transverse colon is air-filled and distended measuring up to

6.6 cm in diameter. The descending colon is nondistended. Scattered colonic

diverticuli are present. There is mild diffuse anasarca.



A 2 mm stone is noted within the lower pole of the right kidney. There is no

evidence of hydronephrosis. The liver, biliary tree, spleen, pancreas, adrenal

glands, and kidneys are otherwise unremarkable.  The abdominal aorta is densely

calcified but within normal limits for size.  There is no evidence of

adenopathy.  



Pelvis: The bladder is unremarkable. Large densely calcified uterine fibroid is

again noted. There is no evidence of free fluid or adenopathy. 



Bones and soft tissues: Degenerative changes are present throughout the lumbar

spine without evidence of lytic or sclerotic lesion.





IMPRESSION:



1. Free air and fluid are present consistent with bowel perforation. Site of

perforation is uncertain, but there appears to be mild thickening of small

bowel loops in the lower mid abdomen. There is mild dilatation of the

transverse colon and scattered colonic diverticuli. The findings were discussed

with Dr. Olson at 0850 on 10/14/2019. 

2. Large calcified uterine fibroid is again noted.







Signed by: Pito Orozco on 10/14/2019 8:51 AM

## 2019-10-15 VITALS — SYSTOLIC BLOOD PRESSURE: 101 MMHG | DIASTOLIC BLOOD PRESSURE: 37 MMHG

## 2019-10-15 VITALS — DIASTOLIC BLOOD PRESSURE: 39 MMHG | SYSTOLIC BLOOD PRESSURE: 92 MMHG

## 2019-10-15 VITALS — DIASTOLIC BLOOD PRESSURE: 36 MMHG | SYSTOLIC BLOOD PRESSURE: 103 MMHG

## 2019-10-15 VITALS — SYSTOLIC BLOOD PRESSURE: 89 MMHG | DIASTOLIC BLOOD PRESSURE: 38 MMHG

## 2019-10-15 VITALS — DIASTOLIC BLOOD PRESSURE: 32 MMHG | SYSTOLIC BLOOD PRESSURE: 99 MMHG

## 2019-10-15 VITALS — SYSTOLIC BLOOD PRESSURE: 91 MMHG | DIASTOLIC BLOOD PRESSURE: 34 MMHG

## 2019-10-15 VITALS — SYSTOLIC BLOOD PRESSURE: 92 MMHG | DIASTOLIC BLOOD PRESSURE: 37 MMHG

## 2019-10-15 VITALS — DIASTOLIC BLOOD PRESSURE: 38 MMHG | SYSTOLIC BLOOD PRESSURE: 98 MMHG

## 2019-10-15 VITALS — DIASTOLIC BLOOD PRESSURE: 45 MMHG | SYSTOLIC BLOOD PRESSURE: 130 MMHG

## 2019-10-15 VITALS — SYSTOLIC BLOOD PRESSURE: 95 MMHG | DIASTOLIC BLOOD PRESSURE: 32 MMHG

## 2019-10-15 VITALS — DIASTOLIC BLOOD PRESSURE: 35 MMHG | SYSTOLIC BLOOD PRESSURE: 101 MMHG

## 2019-10-15 VITALS — SYSTOLIC BLOOD PRESSURE: 97 MMHG | DIASTOLIC BLOOD PRESSURE: 40 MMHG

## 2019-10-15 VITALS — SYSTOLIC BLOOD PRESSURE: 95 MMHG | DIASTOLIC BLOOD PRESSURE: 38 MMHG

## 2019-10-15 VITALS — DIASTOLIC BLOOD PRESSURE: 39 MMHG | SYSTOLIC BLOOD PRESSURE: 93 MMHG

## 2019-10-15 VITALS — DIASTOLIC BLOOD PRESSURE: 39 MMHG | SYSTOLIC BLOOD PRESSURE: 91 MMHG

## 2019-10-15 VITALS — SYSTOLIC BLOOD PRESSURE: 116 MMHG | DIASTOLIC BLOOD PRESSURE: 48 MMHG

## 2019-10-15 VITALS — DIASTOLIC BLOOD PRESSURE: 40 MMHG | SYSTOLIC BLOOD PRESSURE: 97 MMHG

## 2019-10-15 VITALS — SYSTOLIC BLOOD PRESSURE: 90 MMHG | DIASTOLIC BLOOD PRESSURE: 42 MMHG

## 2019-10-15 VITALS — SYSTOLIC BLOOD PRESSURE: 113 MMHG | DIASTOLIC BLOOD PRESSURE: 40 MMHG

## 2019-10-15 VITALS — DIASTOLIC BLOOD PRESSURE: 43 MMHG | SYSTOLIC BLOOD PRESSURE: 123 MMHG

## 2019-10-15 VITALS — DIASTOLIC BLOOD PRESSURE: 40 MMHG | SYSTOLIC BLOOD PRESSURE: 99 MMHG

## 2019-10-15 VITALS — DIASTOLIC BLOOD PRESSURE: 30 MMHG | SYSTOLIC BLOOD PRESSURE: 83 MMHG

## 2019-10-15 VITALS — DIASTOLIC BLOOD PRESSURE: 41 MMHG | SYSTOLIC BLOOD PRESSURE: 106 MMHG

## 2019-10-15 LAB
ALBUMIN SERPL-MCNC: 2.3 G/DL (ref 3.5–5)
ALBUMIN/GLOB SERPL: 0.7 {RATIO} (ref 0.8–2)
ALP SERPL-CCNC: 37 IU/L (ref 40–150)
ALT SERPL-CCNC: 18 IU/L (ref 0–55)
ANION GAP SERPL CALC-SCNC: 13.6 MMOL/L (ref 8–16)
BASOPHILS # BLD AUTO: 0.1 10*3/UL (ref 0–0.1)
BASOPHILS NFR BLD AUTO: 0.4 % (ref 0–1)
BUN SERPL-MCNC: 43 MG/DL (ref 7–26)
BUN/CREAT SERPL: 24 (ref 6–25)
CALCIUM SERPL-MCNC: 7.7 MG/DL (ref 8.4–10.2)
CHLORIDE SERPL-SCNC: 110 MMOL/L (ref 98–107)
CK MB SERPL-MCNC: 1.8 NG/ML (ref 0–5)
CK MB SERPL-MCNC: 2.1 NG/ML (ref 0–5)
CK SERPL-CCNC: 124 IU/L (ref 29–168)
CK SERPL-CCNC: 134 IU/L (ref 29–168)
CO2 SERPL-SCNC: 17 MMOL/L (ref 22–29)
DEPRECATED NEUTROPHILS # BLD AUTO: 16.6 10*3/UL (ref 2.1–6.9)
EGFRCR SERPLBLD CKD-EPI 2021: 29 ML/MIN (ref 60–?)
EOSINOPHIL # BLD AUTO: 0 10*3/UL (ref 0–0.4)
EOSINOPHIL NFR BLD AUTO: 0.1 % (ref 0–6)
ERYTHROCYTE [DISTWIDTH] IN CORD BLOOD: 13.9 % (ref 11.7–14.4)
GLOBULIN PLAS-MCNC: 3.1 G/DL (ref 2.3–3.5)
GLUCOSE SERPLBLD-MCNC: 77 MG/DL (ref 74–118)
HCT VFR BLD AUTO: 27.8 % (ref 34.2–44.1)
HGB BLD-MCNC: 8.7 G/DL (ref 12–16)
LYMPHOCYTES # BLD: 0.9 10*3/UL (ref 1–3.2)
LYMPHOCYTES NFR BLD AUTO: 4.7 % (ref 18–39.1)
MCH RBC QN AUTO: 29.5 PG (ref 28–32)
MCHC RBC AUTO-ENTMCNC: 31.3 G/DL (ref 31–35)
MCV RBC AUTO: 94.2 FL (ref 81–99)
MONOCYTES # BLD AUTO: 0.8 10*3/UL (ref 0.2–0.8)
MONOCYTES NFR BLD AUTO: 4.3 % (ref 4.4–11.3)
NEUTS SEG NFR BLD AUTO: 89.8 % (ref 38.7–80)
PLATELET # BLD AUTO: 248 X10E3/UL (ref 140–360)
POTASSIUM SERPL-SCNC: 4.6 MMOL/L (ref 3.5–5.1)
RBC # BLD AUTO: 2.95 X10E6/UL (ref 3.6–5.1)
SODIUM SERPL-SCNC: 136 MMOL/L (ref 136–145)

## 2019-10-15 RX ADMIN — TAZOBACTAM SODIUM AND PIPERACILLIN SODIUM SCH MLS/HR: 375; 3 INJECTION, SOLUTION INTRAVENOUS at 18:00

## 2019-10-15 RX ADMIN — SODIUM CHLORIDE SCH ML: 900 IRRIGANT IRRIGATION at 20:29

## 2019-10-15 RX ADMIN — SODIUM CHLORIDE SCH ML: 900 IRRIGANT IRRIGATION at 09:23

## 2019-10-15 RX ADMIN — SODIUM CHLORIDE SCH ML: 900 IRRIGANT IRRIGATION at 16:57

## 2019-10-15 RX ADMIN — SODIUM CHLORIDE SCH ML: 900 IRRIGANT IRRIGATION at 04:40

## 2019-10-15 RX ADMIN — ACETAMINOPHEN PRN MG: 10 INJECTION, SOLUTION INTRAVENOUS at 12:27

## 2019-10-15 RX ADMIN — TAZOBACTAM SODIUM AND PIPERACILLIN SODIUM SCH MLS/HR: 375; 3 INJECTION, SOLUTION INTRAVENOUS at 11:29

## 2019-10-15 RX ADMIN — HYDROMORPHONE HYDROCHLORIDE PRN MG: 1 INJECTION, SOLUTION INTRAMUSCULAR; INTRAVENOUS; SUBCUTANEOUS at 16:42

## 2019-10-15 RX ADMIN — SODIUM CHLORIDE SCH MG: 900 INJECTION INTRAVENOUS at 19:00

## 2019-10-15 RX ADMIN — SODIUM CHLORIDE SCH ML: 900 IRRIGANT IRRIGATION at 14:13

## 2019-10-15 RX ADMIN — HYDROMORPHONE HYDROCHLORIDE PRN MG: 1 INJECTION, SOLUTION INTRAMUSCULAR; INTRAVENOUS; SUBCUTANEOUS at 11:29

## 2019-10-15 RX ADMIN — FAMOTIDINE SCH MG: 10 INJECTION, SOLUTION INTRAVENOUS at 17:13

## 2019-10-15 RX ADMIN — ATENOLOL SCH MG: 50 TABLET ORAL at 07:49

## 2019-10-15 RX ADMIN — FAMOTIDINE SCH MG: 10 INJECTION, SOLUTION INTRAVENOUS at 09:23

## 2019-10-15 RX ADMIN — TAZOBACTAM SODIUM AND PIPERACILLIN SODIUM SCH MLS/HR: 375; 3 INJECTION, SOLUTION INTRAVENOUS at 06:00

## 2019-10-15 RX ADMIN — HYDROMORPHONE HYDROCHLORIDE PRN MG: 1 INJECTION, SOLUTION INTRAMUSCULAR; INTRAVENOUS; SUBCUTANEOUS at 21:33

## 2019-10-15 RX ADMIN — ATENOLOL SCH MG: 50 TABLET ORAL at 16:31

## 2019-10-15 RX ADMIN — HYDROMORPHONE HYDROCHLORIDE PRN MG: 1 INJECTION, SOLUTION INTRAMUSCULAR; INTRAVENOUS; SUBCUTANEOUS at 07:58

## 2019-10-15 RX ADMIN — SODIUM CHLORIDE SCH MLS/HR: 9 INJECTION, SOLUTION INTRAVENOUS at 06:49

## 2019-10-15 RX ADMIN — HYDROMORPHONE HYDROCHLORIDE PRN MG: 1 INJECTION, SOLUTION INTRAMUSCULAR; INTRAVENOUS; SUBCUTANEOUS at 16:47

## 2019-10-15 RX ADMIN — SODIUM CHLORIDE SCH MLS/HR: 9 INJECTION, SOLUTION INTRAVENOUS at 14:13

## 2019-10-15 RX ADMIN — SODIUM CHLORIDE SCH ML: 900 IRRIGANT IRRIGATION at 01:46

## 2019-10-15 NOTE — OPERATIVE REPORT
DATE OF PROCEDURE:  10/14/2019

 

SURGEON:  Thanh Grossman MD

 

PREOPERATIVE DIAGNOSIS:  Acute surgical abdomen with peritonitis and perforated viscus.

 

POSTOPERATIVE DIAGNOSIS:  Acute surgical abdomen with peritonitis and perforated viscus

secondary to hole in the terminal ileum. 

 

OPERATIONS PERFORMED:  Exploratory laparotomy, ileocolectomy, and peritoneal lavage.

 

ANESTHESIA:  General.

 

COMPLICATIONS:  None.

 

ESTIMATED BLOOD LOSS:  Minimal.

 

DESCRIPTION OF PROCEDURE:  With the patient lying in bed in the supine position under

good general endotracheal anesthesia, the abdomen was prepped with Betadine solution and

draped in the usual manner.  A midline incision was made, it was carried down through

the subcutaneous tissue and through the midline fascia.  The peritoneum was opened and

the abdomen was entered.  Upon entering the abdominal cavity, some purulent

foul-smelling material was encountered.  This was mostly in the upper abdomen.  This was

all aspirated.  We first looked at the stomach and duodenum and there was no sign of any

perforation of the stomach.  We opened the lesser sac and there was similarly no sign of

any perforation in the posterior aspect of the stomach, although most of the fluid was

in the subhepatic space.  At this point, the colon was inspected.  The patient had a

previous right ileocolectomy for ischemic bowel a year ago and there was an ileocolic

anastomosis in the right upper quadrant.  The ileocolic anastomosis appeared to be

widely patent.  There was some fibrinous material overlying the ileum.  Upon further

examination, there was a small hole in the terminal ileum not at the anastomosis, within

the ileum itself, which was the cause of the peritonitis.  This was not a large hole.

The rest of the colon appeared to be within normal limits.  The patient has a large

calcified uterus, which was known from preoperatively.  The rest of the small bowel had

some fibrinous exudate, but there were no other abnormalities.  The hole was small

enough, __________ closed with a suture, but since we are not quite sure what the

etiology of the issue is and since the patient has had a previous history of ischemic

bowel, we decided that it would be best to resect the whole area that was abnormal

including the old anastomosis, so we decided to go ahead and proceed with resection of

the area of perforation and include the whole anastomosis.  The small bowel was then

divided with an application of GLENDY-75 stapler at the point proximal to the perforation

that looked normal.  Similarly, the midtransverse colon was divided with an application

of GLENDY-75 stapler.  The mesentery of the colon was divided with the EnSeal device and

the specimen was sent for pathological examination.  The area of perforation was marked

with a suture for the pathologist to be able to identify.  After this was done, the

anastomosis was completed with another application of GLENDY stapler bringing the ileum to

the transverse colon without any difficulty and the remaining opening was closed with a

TA-60 stapler.  Gloves and instruments were changed.  The mesenteric rent was then

closed with a running suture of 2-0 Vicryl and the abdomen was then copiously irrigated

with many L of saline solution until the effluent came back perfectly clear from all

quadrants.  Once this was done, the abdomen was then closed in layers.  The peritoneum

was closed with a running suture of #1 Vicryl.  The midline fascia was closed with a

running suture of #1 Vicryl and the skin was closed 

with clips.  A dressing was applied.  The sponge, lap, and needle count was correct.

The patient tolerated the procedure well and returned to the recovery room in stable

condition. 

 

 

 

 

______________________________

MD SANTOSH Simon/DANIEL

D:  10/14/2019 17:13:42

T:  10/15/2019 00:16:04

Job #:  [id number]

## 2019-10-15 NOTE — PROGRESS NOTE
DATE:  10/15/2019  

 

ADDITIONAL ATTENDING PHYSICIAN:  Madhuri Garibay MD

 

CONSULTING PHYSICIANS:  

1. Dr. Kovacs with Cardiology.

2. Dr. Sanchez with GI.

3. Dr. Grossman with Surgery.

 

CHIEF COMPLAINT:  Abdominal pain, status post bowel resection.

 

SUBJECTIVE:  The patient is seen, sitting up in chair.  NG tube in place.  
Complains of

severe abdominal pain.  Mildly lethargic due to the pain medication given a few 
minutes

ago.  She denies any nausea, vomiting, fever, chills, bowel movement or passing 
flatus.

Has abdominal binder for support. 

 

PHYSICAL EXAMINATION:

VITAL SIGNS:  Temperature 98.3, pulse is 83, respirations 22, blood pressure is 
130/47,

pulse ox is 98% on room air. 

GENERAL:  No acute distress. 

HEENT:  Normocephalic, atraumatic. 

NECK:  Supple and midline. 

CARDIOVASCULAR:  Regular rate and rhythm. 

LUNGS:  Decreased breath sounds. 

ABDOMEN:  Tender, binder in place. 

NEUROLOGIC:  Alert, awake, oriented x3. 

MUSCULOSKELETAL:  Moves all extremities. 

SKIN:  Dry and intact.

LABORATORY DATA:  WBC 18.46, hemoglobin is 8.7, hematocrit is 27.8, platelet is 
248.

Sodium is 136, potassium is 4.6, BUN is 43, creatinine is 1.77, estimated GFR is
29,

calcium 7.7, total protein is 5.4, albumin is 2.3.  Troponin x3 is negative.  
Lactic

acid is 18.4. 

 

IMPRESSION AND PLAN:  

1. Status post bowel resection and peritoneal lavage for terminal ileum.  NG 
tube in

place, n.p.o. until bowel functions return.  Continue Zosyn for empiric 
antibiotic.  Dr. Grossman on the case.  We also consulted GI for followup. 

2. Leukocytosis, likely due to bowel perforation and surgery.  She is afebrile. 
We will

continue with antibiotics and trend CBC. 

3. Acute kidney injury.  Creatinine is 1.77.  We will continue with IV fluids 
for

hydration as she is n.p.o. 

4. Hypertension, now hypotensive.  We will continue to monitor closely and treat
as

needed with hydralazine IV. 

5. History of irritable bowel syndrome.  Gastroenterology on the case.  
Appreciate input.

6. High cholesterol.  We will hold all p.o. medications.  She is n.p.o.

7. History of congestive heart failure.  Stable at this time.  We will continue 
tele and

resume medications once p.o. started.  Cardiology is on the case, appreciate 
input. 

8. Anemia with a hemoglobin of 8.7.  We will continue to monitor, likely due to 
surgery.

9. Deep vein thrombosis prophylaxis.  SCDs, we will hold any chemical 
anticoagulation

due to surgery. 

 

 

Dictated by CHAPIN Trotter

 

______________________________

Madhuri Garibay MD

 

MY/MODL

D:  10/15/2019 13:00:05

T:  10/15/2019 15:10:10

Job #:  [id number]

 

Seen and examined on 10/15/2019. Agree with the findings and plan as documented 
by CHAPIN White.

ALYCE

## 2019-10-16 VITALS — DIASTOLIC BLOOD PRESSURE: 44 MMHG | SYSTOLIC BLOOD PRESSURE: 126 MMHG

## 2019-10-16 VITALS — DIASTOLIC BLOOD PRESSURE: 37 MMHG | SYSTOLIC BLOOD PRESSURE: 102 MMHG

## 2019-10-16 VITALS — DIASTOLIC BLOOD PRESSURE: 38 MMHG | SYSTOLIC BLOOD PRESSURE: 106 MMHG

## 2019-10-16 VITALS — SYSTOLIC BLOOD PRESSURE: 96 MMHG | DIASTOLIC BLOOD PRESSURE: 36 MMHG

## 2019-10-16 VITALS — DIASTOLIC BLOOD PRESSURE: 44 MMHG | SYSTOLIC BLOOD PRESSURE: 108 MMHG

## 2019-10-16 VITALS — SYSTOLIC BLOOD PRESSURE: 117 MMHG | DIASTOLIC BLOOD PRESSURE: 53 MMHG

## 2019-10-16 VITALS — SYSTOLIC BLOOD PRESSURE: 112 MMHG | DIASTOLIC BLOOD PRESSURE: 41 MMHG

## 2019-10-16 VITALS — SYSTOLIC BLOOD PRESSURE: 126 MMHG | DIASTOLIC BLOOD PRESSURE: 94 MMHG

## 2019-10-16 VITALS — DIASTOLIC BLOOD PRESSURE: 42 MMHG | SYSTOLIC BLOOD PRESSURE: 121 MMHG

## 2019-10-16 VITALS — SYSTOLIC BLOOD PRESSURE: 104 MMHG | DIASTOLIC BLOOD PRESSURE: 36 MMHG

## 2019-10-16 VITALS — DIASTOLIC BLOOD PRESSURE: 53 MMHG | SYSTOLIC BLOOD PRESSURE: 117 MMHG

## 2019-10-16 VITALS — DIASTOLIC BLOOD PRESSURE: 47 MMHG | SYSTOLIC BLOOD PRESSURE: 121 MMHG

## 2019-10-16 VITALS — DIASTOLIC BLOOD PRESSURE: 47 MMHG | SYSTOLIC BLOOD PRESSURE: 125 MMHG

## 2019-10-16 VITALS — DIASTOLIC BLOOD PRESSURE: 41 MMHG | SYSTOLIC BLOOD PRESSURE: 129 MMHG

## 2019-10-16 VITALS — DIASTOLIC BLOOD PRESSURE: 43 MMHG | SYSTOLIC BLOOD PRESSURE: 108 MMHG

## 2019-10-16 VITALS — DIASTOLIC BLOOD PRESSURE: 36 MMHG | SYSTOLIC BLOOD PRESSURE: 104 MMHG

## 2019-10-16 LAB
ALBUMIN SERPL-MCNC: 2.1 G/DL (ref 3.5–5)
ALBUMIN/GLOB SERPL: 0.7 {RATIO} (ref 0.8–2)
ALP SERPL-CCNC: 48 IU/L (ref 40–150)
ALT SERPL-CCNC: 19 IU/L (ref 0–55)
ANION GAP SERPL CALC-SCNC: 12.6 MMOL/L (ref 8–16)
BASOPHILS # BLD AUTO: 0.1 10*3/UL (ref 0–0.1)
BASOPHILS NFR BLD AUTO: 0.6 % (ref 0–1)
BUN SERPL-MCNC: 51 MG/DL (ref 7–26)
BUN/CREAT SERPL: 23 (ref 6–25)
CALCIUM SERPL-MCNC: 8.1 MG/DL (ref 8.4–10.2)
CHLORIDE SERPL-SCNC: 115 MMOL/L (ref 98–107)
CO2 SERPL-SCNC: 16 MMOL/L (ref 22–29)
DEPRECATED NEUTROPHILS # BLD AUTO: 11.3 10*3/UL (ref 2.1–6.9)
EGFRCR SERPLBLD CKD-EPI 2021: 22 ML/MIN (ref 60–?)
EOSINOPHIL # BLD AUTO: 0.1 10*3/UL (ref 0–0.4)
EOSINOPHIL NFR BLD AUTO: 0.5 % (ref 0–6)
EOSINOPHIL NFR BLD MANUAL: 3 % (ref 0–7)
ERYTHROCYTE [DISTWIDTH] IN CORD BLOOD: 14.3 % (ref 11.7–14.4)
FERRITIN SERPL-MCNC: 1243.01 NG/ML (ref 4.63–204)
GLOBULIN PLAS-MCNC: 3.2 G/DL (ref 2.3–3.5)
GLUCOSE SERPLBLD-MCNC: 64 MG/DL (ref 74–118)
HCT VFR BLD AUTO: 25.8 % (ref 34.2–44.1)
HGB BLD-MCNC: 8 G/DL (ref 12–16)
IRON SATN MFR SERPL: 4 % (ref 15–50)
IRON SERPL-MCNC: 10 UG/DL (ref 50–170)
LYMPHOCYTES # BLD: 0.9 10*3/UL (ref 1–3.2)
LYMPHOCYTES NFR BLD AUTO: 6.7 % (ref 18–39.1)
LYMPHOCYTES NFR BLD MANUAL: 11 % (ref 19–48)
MCH RBC QN AUTO: 29.3 PG (ref 28–32)
MCHC RBC AUTO-ENTMCNC: 31 G/DL (ref 31–35)
MCV RBC AUTO: 94.5 FL (ref 81–99)
MONOCYTES # BLD AUTO: 0.7 10*3/UL (ref 0.2–0.8)
MONOCYTES NFR BLD AUTO: 5.6 % (ref 4.4–11.3)
NEUTS BAND NFR BLD MANUAL: 4 %
NEUTS SEG NFR BLD AUTO: 85.8 % (ref 38.7–80)
NEUTS SEG NFR BLD MANUAL: 81 % (ref 40–74)
PLAT MORPH BLD: NORMAL
PLATELET # BLD AUTO: 219 X10E3/UL (ref 140–360)
PLATELET # BLD EST: ADEQUATE 10*3/UL
POTASSIUM SERPL-SCNC: 4.6 MMOL/L (ref 3.5–5.1)
RBC # BLD AUTO: 2.73 X10E6/UL (ref 3.6–5.1)
RBC MORPH BLD: NORMAL
SODIUM SERPL-SCNC: 139 MMOL/L (ref 136–145)
TIBC SERPL-MCNC: 228 UG/DL (ref 261–478)
TRANSFERRIN SERPL-MCNC: 163 MG/DL (ref 180–382)

## 2019-10-16 RX ADMIN — SODIUM CHLORIDE SCH ML: 900 IRRIGANT IRRIGATION at 21:38

## 2019-10-16 RX ADMIN — PANTOPRAZOLE SODIUM SCH MLS/HR: 40 INJECTION, POWDER, FOR SOLUTION INTRAVENOUS at 01:47

## 2019-10-16 RX ADMIN — SODIUM CHLORIDE PRN MG: 900 INJECTION INTRAVENOUS at 18:35

## 2019-10-16 RX ADMIN — PANTOPRAZOLE SODIUM SCH MLS/HR: 40 INJECTION, POWDER, FOR SOLUTION INTRAVENOUS at 16:04

## 2019-10-16 RX ADMIN — SODIUM CHLORIDE SCH MLS/HR: 9 INJECTION, SOLUTION INTRAVENOUS at 01:26

## 2019-10-16 RX ADMIN — SODIUM CHLORIDE SCH ML: 900 IRRIGANT IRRIGATION at 07:56

## 2019-10-16 RX ADMIN — PANTOPRAZOLE SODIUM SCH MLS/HR: 40 INJECTION, POWDER, FOR SOLUTION INTRAVENOUS at 20:14

## 2019-10-16 RX ADMIN — HYDROMORPHONE HYDROCHLORIDE PRN MG: 1 INJECTION, SOLUTION INTRAMUSCULAR; INTRAVENOUS; SUBCUTANEOUS at 01:26

## 2019-10-16 RX ADMIN — HYDROMORPHONE HYDROCHLORIDE PRN MG: 1 INJECTION, SOLUTION INTRAMUSCULAR; INTRAVENOUS; SUBCUTANEOUS at 22:02

## 2019-10-16 RX ADMIN — HYDROMORPHONE HYDROCHLORIDE PRN MG: 1 INJECTION, SOLUTION INTRAMUSCULAR; INTRAVENOUS; SUBCUTANEOUS at 09:48

## 2019-10-16 RX ADMIN — SODIUM CHLORIDE, POTASSIUM CHLORIDE, SODIUM LACTATE AND CALCIUM CHLORIDE SCH MLS/HR: 600; 310; 30; 20 INJECTION, SOLUTION INTRAVENOUS at 21:00

## 2019-10-16 RX ADMIN — PANTOPRAZOLE SODIUM SCH MLS/HR: 40 INJECTION, POWDER, FOR SOLUTION INTRAVENOUS at 10:49

## 2019-10-16 RX ADMIN — SODIUM CHLORIDE SCH ML: 900 IRRIGANT IRRIGATION at 04:06

## 2019-10-16 RX ADMIN — SODIUM CHLORIDE, POTASSIUM CHLORIDE, SODIUM LACTATE AND CALCIUM CHLORIDE SCH MLS/HR: 600; 310; 30; 20 INJECTION, SOLUTION INTRAVENOUS at 13:38

## 2019-10-16 RX ADMIN — SODIUM CHLORIDE SCH ML: 900 IRRIGANT IRRIGATION at 17:01

## 2019-10-16 RX ADMIN — SODIUM CHLORIDE SCH ML: 900 IRRIGANT IRRIGATION at 18:23

## 2019-10-16 RX ADMIN — SODIUM CHLORIDE SCH ML: 900 IRRIGANT IRRIGATION at 13:37

## 2019-10-16 RX ADMIN — SODIUM CHLORIDE SCH ML: 900 IRRIGANT IRRIGATION at 01:26

## 2019-10-16 RX ADMIN — TAZOBACTAM SODIUM AND PIPERACILLIN SODIUM SCH MLS/HR: 375; 3 INJECTION, SOLUTION INTRAVENOUS at 21:08

## 2019-10-16 RX ADMIN — TAZOBACTAM SODIUM AND PIPERACILLIN SODIUM SCH MLS/HR: 375; 3 INJECTION, SOLUTION INTRAVENOUS at 07:56

## 2019-10-16 RX ADMIN — SODIUM CHLORIDE SCH MLS/HR: 9 INJECTION, SOLUTION INTRAVENOUS at 09:47

## 2019-10-16 RX ADMIN — PANTOPRAZOLE SODIUM SCH MLS/HR: 40 INJECTION, POWDER, FOR SOLUTION INTRAVENOUS at 06:16

## 2019-10-16 RX ADMIN — HYDROMORPHONE HYDROCHLORIDE PRN MG: 1 INJECTION, SOLUTION INTRAMUSCULAR; INTRAVENOUS; SUBCUTANEOUS at 18:38

## 2019-10-16 RX ADMIN — HYDROMORPHONE HYDROCHLORIDE PRN MG: 1 INJECTION, SOLUTION INTRAMUSCULAR; INTRAVENOUS; SUBCUTANEOUS at 05:20

## 2019-10-16 NOTE — NUR
Dr Grossman in with pt and evaluated.  OK to transfer to med-surg. 

-------------------------------------------------------------------------------

Addendum: 10/16/19 at 1221 by Jazmyne Cannon RN

-------------------------------------------------------------------------------

Dr JARED Grossman does not want the stout to be removed today.

## 2019-10-16 NOTE — NUR
PT ARRIVED VIA BED, AA&OX3, RA, NGT TO WALL SUCTION, IRRIGATED PER MD ORDER , HOB ELEVATED, 
DENIES PAIN AT THIS TIME, ORIENTED TO ROOM AND CALL LIGHT WITHIN REACH

## 2019-10-16 NOTE — NUR
MD PRINCE INTO SEE PT, DISCUSSED POC, PT MEDICATED PER MD ORDER FOR 4/10 ABD PAIN, CALL 
LIGHT WITHIN REACH

## 2019-10-16 NOTE — NUR
Report given to Ashley JACOBS.  Have passed on to Ashley that the venofor has to be double 
checked with Dr Garibay.  Call to Dr Noble's office to notify of consult done. 

-------------------------------------------------------------------------------

Addendum: 10/16/19 at 1659 by Jazmyne Cannon RN

-------------------------------------------------------------------------------

Consult to Dr Noble cancelled.  Retracted order by Dr Garibay.

## 2019-10-16 NOTE — NUR
conducted initial assessment with patient. Patient is concerned about working after this 
hospitalization. Discussed long term and short term disability with patient and gave her 
instructions with how to start the process. cm to follow

## 2019-10-17 VITALS — SYSTOLIC BLOOD PRESSURE: 171 MMHG | DIASTOLIC BLOOD PRESSURE: 69 MMHG

## 2019-10-17 VITALS — DIASTOLIC BLOOD PRESSURE: 53 MMHG | SYSTOLIC BLOOD PRESSURE: 115 MMHG

## 2019-10-17 VITALS — DIASTOLIC BLOOD PRESSURE: 63 MMHG | SYSTOLIC BLOOD PRESSURE: 142 MMHG

## 2019-10-17 VITALS — DIASTOLIC BLOOD PRESSURE: 62 MMHG | SYSTOLIC BLOOD PRESSURE: 135 MMHG

## 2019-10-17 VITALS — DIASTOLIC BLOOD PRESSURE: 51 MMHG | SYSTOLIC BLOOD PRESSURE: 111 MMHG

## 2019-10-17 VITALS — SYSTOLIC BLOOD PRESSURE: 150 MMHG | DIASTOLIC BLOOD PRESSURE: 66 MMHG

## 2019-10-17 VITALS — SYSTOLIC BLOOD PRESSURE: 135 MMHG | DIASTOLIC BLOOD PRESSURE: 62 MMHG

## 2019-10-17 VITALS — SYSTOLIC BLOOD PRESSURE: 142 MMHG | DIASTOLIC BLOOD PRESSURE: 63 MMHG

## 2019-10-17 LAB
ALBUMIN SERPL-MCNC: 2.2 G/DL (ref 3.5–5)
ALBUMIN/GLOB SERPL: 0.6 {RATIO} (ref 0.8–2)
ALP SERPL-CCNC: 69 IU/L (ref 40–150)
ALT SERPL-CCNC: 21 IU/L (ref 0–55)
ANION GAP SERPL CALC-SCNC: 14.3 MMOL/L (ref 8–16)
BASOPHILS # BLD AUTO: 0.1 10*3/UL (ref 0–0.1)
BASOPHILS NFR BLD AUTO: 1.1 % (ref 0–1)
BUN SERPL-MCNC: 56 MG/DL (ref 7–26)
BUN/CREAT SERPL: 24 (ref 6–25)
CALCIUM SERPL-MCNC: 8.5 MG/DL (ref 8.4–10.2)
CHLORIDE SERPL-SCNC: 118 MMOL/L (ref 98–107)
CO2 SERPL-SCNC: 16 MMOL/L (ref 22–29)
DEPRECATED NEUTROPHILS # BLD AUTO: 4.5 10*3/UL (ref 2.1–6.9)
EGFRCR SERPLBLD CKD-EPI 2021: 21 ML/MIN (ref 60–?)
EOSINOPHIL # BLD AUTO: 0.1 10*3/UL (ref 0–0.4)
EOSINOPHIL NFR BLD AUTO: 1.1 % (ref 0–6)
ERYTHROCYTE [DISTWIDTH] IN CORD BLOOD: 14.5 % (ref 11.7–14.4)
GLOBULIN PLAS-MCNC: 3.7 G/DL (ref 2.3–3.5)
GLUCOSE SERPLBLD-MCNC: 62 MG/DL (ref 74–118)
HCT VFR BLD AUTO: 26.2 % (ref 34.2–44.1)
HGB BLD-MCNC: 8.2 G/DL (ref 12–16)
LYMPHOCYTES # BLD: 1.1 10*3/UL (ref 1–3.2)
LYMPHOCYTES NFR BLD AUTO: 16.4 % (ref 18–39.1)
MCH RBC QN AUTO: 29.6 PG (ref 28–32)
MCHC RBC AUTO-ENTMCNC: 31.3 G/DL (ref 31–35)
MCV RBC AUTO: 94.6 FL (ref 81–99)
MONOCYTES # BLD AUTO: 0.7 10*3/UL (ref 0.2–0.8)
MONOCYTES NFR BLD AUTO: 10.5 % (ref 4.4–11.3)
NEUTS SEG NFR BLD AUTO: 69.8 % (ref 38.7–80)
PLATELET # BLD AUTO: 224 X10E3/UL (ref 140–360)
POTASSIUM SERPL-SCNC: 4.3 MMOL/L (ref 3.5–5.1)
RBC # BLD AUTO: 2.77 X10E6/UL (ref 3.6–5.1)
SODIUM SERPL-SCNC: 144 MMOL/L (ref 136–145)

## 2019-10-17 RX ADMIN — HYDROMORPHONE HYDROCHLORIDE PRN MG: 1 INJECTION, SOLUTION INTRAMUSCULAR; INTRAVENOUS; SUBCUTANEOUS at 11:41

## 2019-10-17 RX ADMIN — PANTOPRAZOLE SODIUM SCH MLS/HR: 40 INJECTION, POWDER, FOR SOLUTION INTRAVENOUS at 01:30

## 2019-10-17 RX ADMIN — SODIUM CHLORIDE SCH ML: 900 IRRIGANT IRRIGATION at 17:00

## 2019-10-17 RX ADMIN — PANTOPRAZOLE SODIUM SCH MLS/HR: 40 INJECTION, POWDER, FOR SOLUTION INTRAVENOUS at 09:56

## 2019-10-17 RX ADMIN — SODIUM CHLORIDE PRN MG: 900 INJECTION INTRAVENOUS at 11:41

## 2019-10-17 RX ADMIN — SODIUM CHLORIDE SCH ML: 900 IRRIGANT IRRIGATION at 22:49

## 2019-10-17 RX ADMIN — TAZOBACTAM SODIUM AND PIPERACILLIN SODIUM SCH MLS/HR: 375; 3 INJECTION, SOLUTION INTRAVENOUS at 21:38

## 2019-10-17 RX ADMIN — SODIUM CHLORIDE SCH ML: 900 IRRIGANT IRRIGATION at 00:52

## 2019-10-17 RX ADMIN — HYDROMORPHONE HYDROCHLORIDE PRN MG: 1 INJECTION, SOLUTION INTRAMUSCULAR; INTRAVENOUS; SUBCUTANEOUS at 01:30

## 2019-10-17 RX ADMIN — SODIUM CHLORIDE SCH ML: 900 IRRIGANT IRRIGATION at 05:00

## 2019-10-17 RX ADMIN — SODIUM CHLORIDE, POTASSIUM CHLORIDE, SODIUM LACTATE AND CALCIUM CHLORIDE SCH MLS/HR: 600; 310; 30; 20 INJECTION, SOLUTION INTRAVENOUS at 00:52

## 2019-10-17 RX ADMIN — HYDROMORPHONE HYDROCHLORIDE PRN MG: 1 INJECTION, SOLUTION INTRAMUSCULAR; INTRAVENOUS; SUBCUTANEOUS at 21:44

## 2019-10-17 RX ADMIN — SODIUM CHLORIDE SCH ML: 900 IRRIGANT IRRIGATION at 09:38

## 2019-10-17 RX ADMIN — PANTOPRAZOLE SODIUM SCH MLS/HR: 40 INJECTION, POWDER, FOR SOLUTION INTRAVENOUS at 06:39

## 2019-10-17 RX ADMIN — SODIUM CHLORIDE SCH ML: 900 IRRIGANT IRRIGATION at 13:00

## 2019-10-17 RX ADMIN — PANTOPRAZOLE SODIUM SCH MLS/HR: 40 INJECTION, POWDER, FOR SOLUTION INTRAVENOUS at 21:39

## 2019-10-17 RX ADMIN — TAZOBACTAM SODIUM AND PIPERACILLIN SODIUM SCH MLS/HR: 375; 3 INJECTION, SOLUTION INTRAVENOUS at 09:40

## 2019-10-17 RX ADMIN — BISACODYL SCH MG: 10 SUPPOSITORY RECTAL at 21:38

## 2019-10-17 RX ADMIN — SODIUM CHLORIDE, POTASSIUM CHLORIDE, SODIUM LACTATE AND CALCIUM CHLORIDE SCH MLS/HR: 600; 310; 30; 20 INJECTION, SOLUTION INTRAVENOUS at 13:02

## 2019-10-17 RX ADMIN — SODIUM CHLORIDE, POTASSIUM CHLORIDE, SODIUM LACTATE AND CALCIUM CHLORIDE SCH MLS/HR: 600; 310; 30; 20 INJECTION, SOLUTION INTRAVENOUS at 05:00

## 2019-10-17 RX ADMIN — PANTOPRAZOLE SODIUM SCH MLS/HR: 40 INJECTION, POWDER, FOR SOLUTION INTRAVENOUS at 16:12

## 2019-10-17 NOTE — NUR
MEDICATED PER MD ORDER  FOR 6/10 ABD PAIN, EDUCATED TO NOT GET OOB WITHOUT CALLING FOR 
ASSISTANCE, CALL LIGHT WITHIN REACH

## 2019-10-17 NOTE — NUR
Deloris called saying that she and her  have both had an URI that is getting worse (she has had it for 4 days he has had it over a week), they both have a sore throat, sinus congestion/drainage, fever, cough, and wheezing, would like to be seen today (she says they can be seen in the same room at the same time).   WITH STANDBY ASSISTANCE, PT OOB TO CHAIR, CALL LIGHT WITHIN REACH

## 2019-10-17 NOTE — NUR
Nutrition Intervention Note



RD Recommendation(s) for Physician: 

- When feasible, ADAT to goal of GI Soft 

Pt meets criteria for mild protein calorie malnutrition 



Plan of Care: RD following, monitoring for tolerance and adequacy 



Nutrition reason for involvement:

early diet- NPO day 3



RD Assessment

10/17: 64 YOF admitted for bowel perforation, now s/p ileocolectomy. Pt with hx of bowel 
resection, stomach ulcers per pt, ans IBS per MD notes. Pt seen today per NPO day 3. Pt 
discussed during am rounds. NGT in place noted 500 ml of brown output at time of visit. Pt 
reports eating better over the course of the past year since her bowel resection and that 
she does not tolerate certain foods such as broccoli or cabbage, but does well with eggs. 
Intake has remained <75% of meals though per pt and family. Pt reports progressive wt gain 
in the past year as well, approximately 25-30 lb. Pt states she does not like any 
supplements and will not drink them- declined supplementation when diet advanced. All 
questions and concerns addressed at time of visit. Will monitor and continue to follow. 



Principal Problems/Diagnoses: bowel perforation 



PMH: IBS, bowel resection, HTN, high cholesterol, CKD, CHF, cholecystectomy



GI: s/p ileocolectomy, + NGT with 1150 ml output yesterday 



Skin: abdominal incision 



Labs: 10/17: na 144, k 4.3, BUN 56, Cr 2.3, Gluc 62 



IVF: LR at 100 ml/hr 



Meds: zofran, pantoprazole, abx 



Ht: 63 in

Wt: 133 lb

BMI: 23.6

IBW: 115 lb





Malnutrition Evaluation (10/17/19)

The patient meets criteria for MILD protein-calorie malnutrition. 



Energy intake:

<75% of estimated energy requirements for >3 months

Weight loss:

No wt loss reported, reported wt gain in past 6 mo. 

Fat loss: Mild, eyes- slightly hollow look

Muscle loss: Mild, temple- slight hollowing 

Supporting Evidence:

Fluid accumulation: None 

Functional Status: unable to evaluate





Nutrition Prescription (Diet Order): NPO



Estimated Nutritional Needs:

5790-1898 calories/day (25-35 kcal/kg CBW)

60-90 g protein/day (1-1.5 g pro/kg CBW)





Diet Adequacy:

Not meeting calorie needs, Not meeting protein needs





Diet Education Needs Assessment:

Diet education not indicated, patient on temporary/transition diet.



Nutrition Care Level: Mod 



Nutrition Diagnosis: Inadequate energy and protein intake related to bowel perforation 
requiring ileocolectomy as evidenced by pt remaining NPO after surgery and not meeting 
needs. 





Goal: Patient will meet % of estimated needs by follow up 

Progress: N/A



Interventions:

fiber modified diet,Recommended Modifications, Skill Development,  Collaboration with other 
providers



Monitoring/Evaluation:

Total energy intake, Total protein intake, Modified diet





Signed: Nayeli Mike RD, LD, CNSC

## 2019-10-18 VITALS — SYSTOLIC BLOOD PRESSURE: 143 MMHG | DIASTOLIC BLOOD PRESSURE: 65 MMHG

## 2019-10-18 VITALS — DIASTOLIC BLOOD PRESSURE: 63 MMHG | SYSTOLIC BLOOD PRESSURE: 142 MMHG

## 2019-10-18 VITALS — DIASTOLIC BLOOD PRESSURE: 67 MMHG | SYSTOLIC BLOOD PRESSURE: 150 MMHG

## 2019-10-18 VITALS — SYSTOLIC BLOOD PRESSURE: 173 MMHG | DIASTOLIC BLOOD PRESSURE: 74 MMHG

## 2019-10-18 VITALS — DIASTOLIC BLOOD PRESSURE: 67 MMHG | SYSTOLIC BLOOD PRESSURE: 146 MMHG

## 2019-10-18 VITALS — SYSTOLIC BLOOD PRESSURE: 163 MMHG | DIASTOLIC BLOOD PRESSURE: 72 MMHG

## 2019-10-18 VITALS — DIASTOLIC BLOOD PRESSURE: 69 MMHG | SYSTOLIC BLOOD PRESSURE: 160 MMHG

## 2019-10-18 LAB
ANION GAP SERPL CALC-SCNC: 15.2 MMOL/L (ref 8–16)
BASOPHILS # BLD AUTO: 0.1 10*3/UL (ref 0–0.1)
BASOPHILS NFR BLD AUTO: 0.8 % (ref 0–1)
BUN SERPL-MCNC: 57 MG/DL (ref 7–26)
BUN/CREAT SERPL: 24 (ref 6–25)
CALCIUM SERPL-MCNC: 8.7 MG/DL (ref 8.4–10.2)
CHLORIDE SERPL-SCNC: 119 MMOL/L (ref 98–107)
CO2 SERPL-SCNC: 16 MMOL/L (ref 22–29)
DEPRECATED NEUTROPHILS # BLD AUTO: 4 10*3/UL (ref 2.1–6.9)
EGFRCR SERPLBLD CKD-EPI 2021: 21 ML/MIN (ref 60–?)
EOSINOPHIL # BLD AUTO: 0.1 10*3/UL (ref 0–0.4)
EOSINOPHIL NFR BLD AUTO: 1.7 % (ref 0–6)
ERYTHROCYTE [DISTWIDTH] IN CORD BLOOD: 14.6 % (ref 11.7–14.4)
GLUCOSE SERPLBLD-MCNC: 64 MG/DL (ref 74–118)
HCT VFR BLD AUTO: 26.2 % (ref 34.2–44.1)
HGB BLD-MCNC: 8 G/DL (ref 12–16)
LYMPHOCYTES # BLD: 1 10*3/UL (ref 1–3.2)
LYMPHOCYTES NFR BLD AUTO: 16.2 % (ref 18–39.1)
MCH RBC QN AUTO: 29 PG (ref 28–32)
MCHC RBC AUTO-ENTMCNC: 30.5 G/DL (ref 31–35)
MCV RBC AUTO: 94.9 FL (ref 81–99)
MONOCYTES # BLD AUTO: 0.8 10*3/UL (ref 0.2–0.8)
MONOCYTES NFR BLD AUTO: 13.4 % (ref 4.4–11.3)
NEUTS SEG NFR BLD AUTO: 67.1 % (ref 38.7–80)
PLATELET # BLD AUTO: 229 X10E3/UL (ref 140–360)
POTASSIUM SERPL-SCNC: 4.2 MMOL/L (ref 3.5–5.1)
RBC # BLD AUTO: 2.76 X10E6/UL (ref 3.6–5.1)
SODIUM SERPL-SCNC: 146 MMOL/L (ref 136–145)

## 2019-10-18 RX ADMIN — SODIUM CHLORIDE SCH ML: 900 IRRIGANT IRRIGATION at 09:51

## 2019-10-18 RX ADMIN — SODIUM CHLORIDE SCH ML: 900 IRRIGANT IRRIGATION at 13:32

## 2019-10-18 RX ADMIN — TAZOBACTAM SODIUM AND PIPERACILLIN SODIUM SCH MLS/HR: 375; 3 INJECTION, SOLUTION INTRAVENOUS at 21:25

## 2019-10-18 RX ADMIN — PANTOPRAZOLE SODIUM SCH MLS/HR: 40 INJECTION, POWDER, FOR SOLUTION INTRAVENOUS at 09:51

## 2019-10-18 RX ADMIN — SODIUM CHLORIDE PRN MG: 900 INJECTION INTRAVENOUS at 10:00

## 2019-10-18 RX ADMIN — TAZOBACTAM SODIUM AND PIPERACILLIN SODIUM SCH MLS/HR: 375; 3 INJECTION, SOLUTION INTRAVENOUS at 08:34

## 2019-10-18 RX ADMIN — SODIUM CHLORIDE, POTASSIUM CHLORIDE, SODIUM LACTATE AND CALCIUM CHLORIDE SCH MLS/HR: 600; 310; 30; 20 INJECTION, SOLUTION INTRAVENOUS at 03:17

## 2019-10-18 RX ADMIN — HYDROMORPHONE HYDROCHLORIDE PRN MG: 1 INJECTION, SOLUTION INTRAMUSCULAR; INTRAVENOUS; SUBCUTANEOUS at 13:32

## 2019-10-18 RX ADMIN — SODIUM CHLORIDE SCH ML: 900 IRRIGANT IRRIGATION at 16:33

## 2019-10-18 RX ADMIN — HYDROMORPHONE HYDROCHLORIDE PRN MG: 1 INJECTION, SOLUTION INTRAMUSCULAR; INTRAVENOUS; SUBCUTANEOUS at 04:52

## 2019-10-18 RX ADMIN — PANTOPRAZOLE SODIUM SCH MLS/HR: 40 INJECTION, POWDER, FOR SOLUTION INTRAVENOUS at 17:10

## 2019-10-18 RX ADMIN — PANTOPRAZOLE SODIUM SCH MLS/HR: 40 INJECTION, POWDER, FOR SOLUTION INTRAVENOUS at 17:11

## 2019-10-18 RX ADMIN — HYDROMORPHONE HYDROCHLORIDE PRN MG: 1 INJECTION, SOLUTION INTRAMUSCULAR; INTRAVENOUS; SUBCUTANEOUS at 08:35

## 2019-10-18 RX ADMIN — PANTOPRAZOLE SODIUM SCH MLS/HR: 40 INJECTION, POWDER, FOR SOLUTION INTRAVENOUS at 23:05

## 2019-10-18 RX ADMIN — SODIUM CHLORIDE SCH ML: 900 IRRIGANT IRRIGATION at 05:57

## 2019-10-18 RX ADMIN — SODIUM CHLORIDE PRN MG: 900 INJECTION INTRAVENOUS at 13:32

## 2019-10-18 RX ADMIN — HYDROCODONE BITARTRATE AND ACETAMINOPHEN PRN EA: 7.5; 325 TABLET ORAL at 17:11

## 2019-10-18 RX ADMIN — BISACODYL SCH MG: 10 SUPPOSITORY RECTAL at 21:00

## 2019-10-18 RX ADMIN — PANTOPRAZOLE SODIUM SCH MLS/HR: 40 INJECTION, POWDER, FOR SOLUTION INTRAVENOUS at 04:37

## 2019-10-18 RX ADMIN — SODIUM CHLORIDE, POTASSIUM CHLORIDE, SODIUM LACTATE AND CALCIUM CHLORIDE SCH MLS/HR: 600; 310; 30; 20 INJECTION, SOLUTION INTRAVENOUS at 08:34

## 2019-10-18 RX ADMIN — BISACODYL SCH MG: 10 SUPPOSITORY RECTAL at 08:00

## 2019-10-18 NOTE — NUR
Patient OOB and ambulated with PT, sitting up on chair in the room, pains well managed, will 
monitor.

## 2019-10-18 NOTE — NUR
Patient alert and responsive, OOB with assistance to bathroom, NG tube flushed and draining, 
no distress, protonix running and IV fluids reconnected, will monitor.

## 2019-10-18 NOTE — NUR
Rounds by Dr. MILADY Acevedo and changed dressing to surgical site and abdomen, abdominal binder in 
place and incision well approximated. IV fluids decreased, started on clear liquid diet and 
will monitor. Orders to d/c NG tube and has been removed at this time.

## 2019-10-19 VITALS — DIASTOLIC BLOOD PRESSURE: 77 MMHG | SYSTOLIC BLOOD PRESSURE: 179 MMHG

## 2019-10-19 VITALS — DIASTOLIC BLOOD PRESSURE: 59 MMHG | SYSTOLIC BLOOD PRESSURE: 126 MMHG

## 2019-10-19 VITALS — DIASTOLIC BLOOD PRESSURE: 74 MMHG | SYSTOLIC BLOOD PRESSURE: 172 MMHG

## 2019-10-19 VITALS — DIASTOLIC BLOOD PRESSURE: 78 MMHG | SYSTOLIC BLOOD PRESSURE: 180 MMHG

## 2019-10-19 VITALS — SYSTOLIC BLOOD PRESSURE: 160 MMHG | DIASTOLIC BLOOD PRESSURE: 71 MMHG

## 2019-10-19 VITALS — DIASTOLIC BLOOD PRESSURE: 71 MMHG | SYSTOLIC BLOOD PRESSURE: 160 MMHG

## 2019-10-19 VITALS — SYSTOLIC BLOOD PRESSURE: 162 MMHG | DIASTOLIC BLOOD PRESSURE: 70 MMHG

## 2019-10-19 LAB
ANION GAP SERPL CALC-SCNC: 13.6 MMOL/L (ref 8–16)
BASOPHILS # BLD AUTO: 0 10*3/UL (ref 0–0.1)
BASOPHILS NFR BLD AUTO: 0.6 % (ref 0–1)
BUN SERPL-MCNC: 36 MG/DL (ref 7–26)
BUN/CREAT SERPL: 19 (ref 6–25)
CALCIUM SERPL-MCNC: 8.9 MG/DL (ref 8.4–10.2)
CHLORIDE SERPL-SCNC: 115 MMOL/L (ref 98–107)
CO2 SERPL-SCNC: 18 MMOL/L (ref 22–29)
DEPRECATED NEUTROPHILS # BLD AUTO: 4.2 10*3/UL (ref 2.1–6.9)
EGFRCR SERPLBLD CKD-EPI 2021: 26 ML/MIN (ref 60–?)
EOSINOPHIL # BLD AUTO: 0.2 10*3/UL (ref 0–0.4)
EOSINOPHIL NFR BLD AUTO: 3.1 % (ref 0–6)
ERYTHROCYTE [DISTWIDTH] IN CORD BLOOD: 14.5 % (ref 11.7–14.4)
GLUCOSE SERPLBLD-MCNC: 112 MG/DL (ref 74–118)
HCT VFR BLD AUTO: 27 % (ref 34.2–44.1)
HGB BLD-MCNC: 8 G/DL (ref 12–16)
LYMPHOCYTES # BLD: 1.3 10*3/UL (ref 1–3.2)
LYMPHOCYTES NFR BLD AUTO: 20 % (ref 18–39.1)
MCH RBC QN AUTO: 28.9 PG (ref 28–32)
MCHC RBC AUTO-ENTMCNC: 29.6 G/DL (ref 31–35)
MCV RBC AUTO: 97.5 FL (ref 81–99)
MONOCYTES # BLD AUTO: 0.9 10*3/UL (ref 0.2–0.8)
MONOCYTES NFR BLD AUTO: 13.3 % (ref 4.4–11.3)
NEUTS SEG NFR BLD AUTO: 62.4 % (ref 38.7–80)
PLATELET # BLD AUTO: 232 X10E3/UL (ref 140–360)
POTASSIUM SERPL-SCNC: 3.6 MMOL/L (ref 3.5–5.1)
RBC # BLD AUTO: 2.77 X10E6/UL (ref 3.6–5.1)
SODIUM SERPL-SCNC: 143 MMOL/L (ref 136–145)

## 2019-10-19 RX ADMIN — HYDROMORPHONE HYDROCHLORIDE PRN MG: 1 INJECTION, SOLUTION INTRAMUSCULAR; INTRAVENOUS; SUBCUTANEOUS at 07:45

## 2019-10-19 RX ADMIN — SODIUM CHLORIDE PRN MG: 900 INJECTION INTRAVENOUS at 07:45

## 2019-10-19 RX ADMIN — PANTOPRAZOLE SODIUM SCH MLS/HR: 40 INJECTION, POWDER, FOR SOLUTION INTRAVENOUS at 10:16

## 2019-10-19 RX ADMIN — HYDROCODONE BITARTRATE AND ACETAMINOPHEN PRN EA: 7.5; 325 TABLET ORAL at 11:53

## 2019-10-19 RX ADMIN — HYDROMORPHONE HYDROCHLORIDE PRN MG: 1 INJECTION, SOLUTION INTRAMUSCULAR; INTRAVENOUS; SUBCUTANEOUS at 01:25

## 2019-10-19 RX ADMIN — TAZOBACTAM SODIUM AND PIPERACILLIN SODIUM SCH MLS/HR: 375; 3 INJECTION, SOLUTION INTRAVENOUS at 21:30

## 2019-10-19 RX ADMIN — SODIUM CHLORIDE, POTASSIUM CHLORIDE, SODIUM LACTATE AND CALCIUM CHLORIDE SCH MLS/HR: 600; 310; 30; 20 INJECTION, SOLUTION INTRAVENOUS at 03:53

## 2019-10-19 RX ADMIN — SODIUM CHLORIDE SCH MLS/HR: 9 INJECTION, SOLUTION INTRAVENOUS at 18:00

## 2019-10-19 RX ADMIN — PANTOPRAZOLE SODIUM SCH MLS/HR: 40 INJECTION, POWDER, FOR SOLUTION INTRAVENOUS at 03:53

## 2019-10-19 RX ADMIN — HYDRALAZINE HYDROCHLORIDE PRN MG: 20 INJECTION INTRAMUSCULAR; INTRAVENOUS at 12:06

## 2019-10-19 RX ADMIN — PANTOPRAZOLE SODIUM SCH MLS/HR: 40 INJECTION, POWDER, FOR SOLUTION INTRAVENOUS at 16:24

## 2019-10-19 RX ADMIN — HYDROMORPHONE HYDROCHLORIDE PRN MG: 1 INJECTION, SOLUTION INTRAMUSCULAR; INTRAVENOUS; SUBCUTANEOUS at 20:02

## 2019-10-19 RX ADMIN — PANTOPRAZOLE SODIUM SCH MLS/HR: 40 INJECTION, POWDER, FOR SOLUTION INTRAVENOUS at 19:45

## 2019-10-19 RX ADMIN — SODIUM CHLORIDE SCH MLS/HR: 9 INJECTION, SOLUTION INTRAVENOUS at 12:07

## 2019-10-19 RX ADMIN — HYDROMORPHONE HYDROCHLORIDE PRN MG: 1 INJECTION, SOLUTION INTRAMUSCULAR; INTRAVENOUS; SUBCUTANEOUS at 12:07

## 2019-10-19 RX ADMIN — SODIUM CHLORIDE, POTASSIUM CHLORIDE, SODIUM LACTATE AND CALCIUM CHLORIDE SCH MLS/HR: 600; 310; 30; 20 INJECTION, SOLUTION INTRAVENOUS at 17:02

## 2019-10-19 RX ADMIN — SODIUM CHLORIDE PRN MG: 900 INJECTION INTRAVENOUS at 20:00

## 2019-10-19 RX ADMIN — TAZOBACTAM SODIUM AND PIPERACILLIN SODIUM SCH MLS/HR: 375; 3 INJECTION, SOLUTION INTRAVENOUS at 09:00

## 2019-10-19 RX ADMIN — BISACODYL SCH MG: 10 SUPPOSITORY RECTAL at 08:00

## 2019-10-19 NOTE — DIAGNOSTIC IMAGING REPORT
Exam: Abdominal film 



Clinical History: Surgery



Comparison: CT October 14, 2019



DISCUSSION: 



Dilated loops of air-filled small bowel measuring up to 5 cm with air-fluid

levels. Calcified pelvic fibroid.



IMPRESSION:



Small bowel ileus















Signed by: Dr. Andrew Palisch, M.D. on 10/19/2019 3:08 PM

## 2019-10-19 NOTE — NUR
Received patient and alert and responsive, had 2 BMs last night, call light within reach, no 
nausea, no vomiting, pains managed, will monitor.

## 2019-10-20 VITALS — DIASTOLIC BLOOD PRESSURE: 67 MMHG | SYSTOLIC BLOOD PRESSURE: 148 MMHG

## 2019-10-20 VITALS — DIASTOLIC BLOOD PRESSURE: 79 MMHG | SYSTOLIC BLOOD PRESSURE: 184 MMHG

## 2019-10-20 VITALS — SYSTOLIC BLOOD PRESSURE: 160 MMHG | DIASTOLIC BLOOD PRESSURE: 69 MMHG

## 2019-10-20 VITALS — SYSTOLIC BLOOD PRESSURE: 147 MMHG | DIASTOLIC BLOOD PRESSURE: 67 MMHG

## 2019-10-20 VITALS — DIASTOLIC BLOOD PRESSURE: 70 MMHG | SYSTOLIC BLOOD PRESSURE: 170 MMHG

## 2019-10-20 VITALS — SYSTOLIC BLOOD PRESSURE: 170 MMHG | DIASTOLIC BLOOD PRESSURE: 70 MMHG

## 2019-10-20 VITALS — DIASTOLIC BLOOD PRESSURE: 67 MMHG | SYSTOLIC BLOOD PRESSURE: 158 MMHG

## 2019-10-20 VITALS — DIASTOLIC BLOOD PRESSURE: 67 MMHG | SYSTOLIC BLOOD PRESSURE: 147 MMHG

## 2019-10-20 LAB
ANION GAP SERPL CALC-SCNC: 11.8 MMOL/L (ref 8–16)
BUN SERPL-MCNC: 33 MG/DL (ref 7–26)
BUN/CREAT SERPL: 19 (ref 6–25)
CALCIUM SERPL-MCNC: 8.4 MG/DL (ref 8.4–10.2)
CHLORIDE SERPL-SCNC: 116 MMOL/L (ref 98–107)
CO2 SERPL-SCNC: 19 MMOL/L (ref 22–29)
EGFRCR SERPLBLD CKD-EPI 2021: 30 ML/MIN (ref 60–?)
GLUCOSE SERPLBLD-MCNC: 73 MG/DL (ref 74–118)
POTASSIUM SERPL-SCNC: 3.8 MMOL/L (ref 3.5–5.1)
SODIUM SERPL-SCNC: 143 MMOL/L (ref 136–145)

## 2019-10-20 RX ADMIN — HYDROMORPHONE HYDROCHLORIDE PRN MG: 1 INJECTION, SOLUTION INTRAMUSCULAR; INTRAVENOUS; SUBCUTANEOUS at 21:28

## 2019-10-20 RX ADMIN — METOCLOPRAMIDE SCH MG: 5 INJECTION, SOLUTION INTRAMUSCULAR; INTRAVENOUS at 12:53

## 2019-10-20 RX ADMIN — SODIUM CHLORIDE PRN MG: 900 INJECTION INTRAVENOUS at 21:27

## 2019-10-20 RX ADMIN — SODIUM CHLORIDE SCH MLS/HR: 9 INJECTION, SOLUTION INTRAVENOUS at 11:41

## 2019-10-20 RX ADMIN — PANTOPRAZOLE SODIUM SCH MLS/HR: 40 INJECTION, POWDER, FOR SOLUTION INTRAVENOUS at 10:05

## 2019-10-20 RX ADMIN — PANTOPRAZOLE SODIUM SCH MLS/HR: 40 INJECTION, POWDER, FOR SOLUTION INTRAVENOUS at 04:46

## 2019-10-20 RX ADMIN — TAZOBACTAM SODIUM AND PIPERACILLIN SODIUM SCH MLS/HR: 375; 3 INJECTION, SOLUTION INTRAVENOUS at 21:16

## 2019-10-20 RX ADMIN — METOCLOPRAMIDE SCH MG: 5 INJECTION, SOLUTION INTRAMUSCULAR; INTRAVENOUS at 19:27

## 2019-10-20 RX ADMIN — HYDRALAZINE HYDROCHLORIDE PRN MG: 20 INJECTION INTRAMUSCULAR; INTRAVENOUS at 12:53

## 2019-10-20 RX ADMIN — SODIUM CHLORIDE SCH MLS/HR: 9 INJECTION, SOLUTION INTRAVENOUS at 19:51

## 2019-10-20 RX ADMIN — HYDROMORPHONE HYDROCHLORIDE PRN MG: 1 INJECTION, SOLUTION INTRAMUSCULAR; INTRAVENOUS; SUBCUTANEOUS at 04:35

## 2019-10-20 RX ADMIN — PANTOPRAZOLE SODIUM SCH MLS/HR: 40 INJECTION, POWDER, FOR SOLUTION INTRAVENOUS at 19:51

## 2019-10-20 RX ADMIN — PANTOPRAZOLE SODIUM SCH MLS/HR: 40 INJECTION, POWDER, FOR SOLUTION INTRAVENOUS at 15:58

## 2019-10-20 RX ADMIN — SODIUM CHLORIDE PRN MG: 900 INJECTION INTRAVENOUS at 04:35

## 2019-10-20 RX ADMIN — TAZOBACTAM SODIUM AND PIPERACILLIN SODIUM SCH MLS/HR: 375; 3 INJECTION, SOLUTION INTRAVENOUS at 09:06

## 2019-10-20 RX ADMIN — PANTOPRAZOLE SODIUM SCH MLS/HR: 40 INJECTION, POWDER, FOR SOLUTION INTRAVENOUS at 00:13

## 2019-10-20 RX ADMIN — SODIUM CHLORIDE SCH MLS/HR: 9 INJECTION, SOLUTION INTRAVENOUS at 09:06

## 2019-10-20 RX ADMIN — AMLODIPINE BESYLATE SCH MG: 5 TABLET ORAL at 13:30

## 2019-10-20 NOTE — NUR
Rounds by Dr. MATT Acevedo and orders to offer sips of clear liquid and see how patient will 
tolerate. Orders for Reglan 10q6 IV x24 hours.

## 2019-10-20 NOTE — DIAGNOSTIC IMAGING REPORT
RADIOGRAPH(S) OF THE ABDOMEN AND PELVIS, 2 view(s)    



HISTORY:  Abdominal pain



COMPARISON:  Abdominal radiographs October 19, 2019.



FINDINGS:

See impression



IMPRESSION: 

1.  Interval decreased gaseous distention of the small bowel, with residual

dilated loops.

2.  Unchanged metallic skin staples, radiopaque suture material, and partially

calcified large degenerating uterine fibroid.



Signed by: Dr. Nando White D.O., M.M.M. on 10/20/2019 9:21 AM

## 2019-10-20 NOTE — NUR
Patient had some jello and clear sips and tolerated well, no c/o abdominal pains, no N/V, no 
resp distress, OOB and ambulated several times today, will monitor. IV in place running.

## 2019-10-20 NOTE — NUR
Patient alert and responsive, medicated for pain by outgoing nurse, denies any measure pains 
at this time, no N/V, remains NPO for now and will 2 view imaging abd ordered, will monitor, 
call light within reach,

## 2019-10-20 NOTE — NUR
Patient had 2 BMs loose this shift, refused suppository for now and will consider later at 
bed time, tolerated fluids by mouth, no abdominal pain, will monitor.

## 2019-10-20 NOTE — NUR
Assessment done.no resp.distress.ambulates.had diarrhea. is in the unit to see the 
pt.notified about diarrhea.not recommended for lab investigation.bed locked and in lowest 
position.phone and call light within reach.instructed to al for assistance as needed.

## 2019-10-20 NOTE — NUR
Visit made by the Spiritual Care Department Pastoral Visitor, Jaycob Jaramillo. PV provided 
pastoral presence, hospitality, and supportive listening. 

Pastoral Visitor informed pt/family of the scope of Chaplaincy Services and availability.



SHYAM MILLAN



Spiritual Care Department

O: [phone]

Pager: [phone] ([phone] calling [phone])

## 2019-10-21 VITALS — DIASTOLIC BLOOD PRESSURE: 70 MMHG | SYSTOLIC BLOOD PRESSURE: 173 MMHG

## 2019-10-21 VITALS — DIASTOLIC BLOOD PRESSURE: 62 MMHG | SYSTOLIC BLOOD PRESSURE: 139 MMHG

## 2019-10-21 VITALS — SYSTOLIC BLOOD PRESSURE: 173 MMHG | DIASTOLIC BLOOD PRESSURE: 70 MMHG

## 2019-10-21 VITALS — DIASTOLIC BLOOD PRESSURE: 72 MMHG | SYSTOLIC BLOOD PRESSURE: 164 MMHG

## 2019-10-21 VITALS — DIASTOLIC BLOOD PRESSURE: 61 MMHG | SYSTOLIC BLOOD PRESSURE: 136 MMHG

## 2019-10-21 VITALS — SYSTOLIC BLOOD PRESSURE: 162 MMHG | DIASTOLIC BLOOD PRESSURE: 66 MMHG

## 2019-10-21 VITALS — DIASTOLIC BLOOD PRESSURE: 72 MMHG | SYSTOLIC BLOOD PRESSURE: 165 MMHG

## 2019-10-21 RX ADMIN — SODIUM CHLORIDE SCH MLS/HR: 900 INJECTION INTRAVENOUS at 05:22

## 2019-10-21 RX ADMIN — SODIUM CHLORIDE SCH MLS/HR: 900 INJECTION INTRAVENOUS at 15:30

## 2019-10-21 RX ADMIN — HYDROCODONE BITARTRATE AND ACETAMINOPHEN PRN EA: 7.5; 325 TABLET ORAL at 23:25

## 2019-10-21 RX ADMIN — METOCLOPRAMIDE SCH MG: 5 INJECTION, SOLUTION INTRAMUSCULAR; INTRAVENOUS at 00:20

## 2019-10-21 RX ADMIN — PANTOPRAZOLE SODIUM SCH MLS/HR: 40 INJECTION, POWDER, FOR SOLUTION INTRAVENOUS at 00:20

## 2019-10-21 RX ADMIN — SODIUM CHLORIDE SCH MLS/HR: 9 INJECTION, SOLUTION INTRAVENOUS at 11:37

## 2019-10-21 RX ADMIN — METOCLOPRAMIDE SCH MG: 5 INJECTION, SOLUTION INTRAMUSCULAR; INTRAVENOUS at 11:37

## 2019-10-21 RX ADMIN — SODIUM CHLORIDE SCH MLS/HR: 900 INJECTION INTRAVENOUS at 21:27

## 2019-10-21 RX ADMIN — TAZOBACTAM SODIUM AND PIPERACILLIN SODIUM SCH MLS/HR: 375; 3 INJECTION, SOLUTION INTRAVENOUS at 08:59

## 2019-10-21 RX ADMIN — SODIUM CHLORIDE SCH MLS/HR: 9 INJECTION, SOLUTION INTRAVENOUS at 08:58

## 2019-10-21 RX ADMIN — AMLODIPINE BESYLATE SCH MG: 5 TABLET ORAL at 08:58

## 2019-10-21 RX ADMIN — SODIUM CHLORIDE SCH MLS/HR: 900 INJECTION INTRAVENOUS at 10:20

## 2019-10-21 RX ADMIN — METOCLOPRAMIDE SCH MG: 5 INJECTION, SOLUTION INTRAMUSCULAR; INTRAVENOUS at 05:22

## 2019-10-21 NOTE — NUR
Removed Right IJ catheter. Removed 2 sutures. Tip intact. Held pressure for 5 minutes and 
pressure dressing applied. Patient tolerated well

## 2019-10-21 NOTE — NUR
Tolerates the sips of clear liquid.refused to administer dulcolax suppository.had 3 x bowel 
movement. is in the unit to see the pt.ordered to continue protonix 
drip.ambulates.bed locked and in lowest position.phone and call light within 
reach.instructed to call for assistance as needed.

## 2019-10-21 NOTE — NUR
BEDSIDE ROUNDS COMPLETE NO DISTRESS NOTED,PT IN STABLE CONDITION DENEIS PAIN AT THIS TIME, 
STAPLES TO ABDOMEN INTACT, ABDOMEN BINDER IN PLACE, IVF INFUSING TO R IJ INTACT, R AC 20G SL 
NO SS OF INFILTRATION NOTED, NO OTHER CO VOCIED CALL LIGHT IN REACH WILL CONTINUE TO MONITOR

## 2019-10-21 NOTE — NUR
Nutrition Intervention Note



RD Recommendation(s) for Physician: 

- When feasible, ADAT to goal of GI Soft 

- If unable to advance diet, consider parenteral nutrition and consult Nutrition for 
recommendations

Pt meets criteria for mild protein calorie malnutrition 



Plan of Care: RD following, monitoring for tolerance and adequacy, diet rec's 



Nutrition reason for involvement:

follow up



RD Assessment

10/21: Pt seen for f/u. Pt discussed during am rounds. Pt was tolerating CL diet on Friday, 
developed an ileus over the weekend and diet was held. Plan for sips of clears today, 
tolerating water currently. Pt denies nausea or abdominal pain. All questions and concerns 
at time of visit. Will monitor and continue to follow. 



10/17: 64 YOF admitted for bowel perforation, now s/p ileocolectomy. Pt with hx of bowel 
resection, stomach ulcers per pt, ans IBS per MD notes. Pt seen today per NPO day 3. Pt 
discussed during am rounds. NGT in place noted 500 ml of brown output at time of visit. Pt 
reports eating better over the course of the past year since her bowel resection and that 
she does not tolerate certain foods such as broccoli or cabbage, but does well with eggs. 
Intake has remained <75% of meals though per pt and family. Pt reports progressive wt gain 
in the past year as well, approximately 25-30 lb. Pt states she does not like any 
supplements and will not drink them- declined supplementation when diet advanced. All 
questions and concerns addressed at time of visit. Will monitor and continue to follow. 



Principal Problems/Diagnoses: bowel perforation 



PMH: IBS, bowel resection, HTN, high cholesterol, CKD, CHF, cholecystectomy



GI: s/p ileocolectomy. BM x 3 per pt 



Skin: abdominal incision 



Labs: 10/20: Na 143, K 3.8, BUN 35, Cr 1.74, Gluc 73, Ca 8.4

10/17: na 144, k 4.3, BUN 56, Cr 2.3, Gluc 62 



IVF: LR + KCl 20 mEq/L at 80 ml/hr 



Meds: zofran, pantoprazole, abx 



Ht: 63 in

Wt: 133 lb

BMI: 23.6

IBW: 115 lb





Malnutrition Evaluation (10/17/19)

The patient meets criteria for MILD protein-calorie malnutrition. 



Energy intake:

<75% of estimated energy requirements for >3 months

Weight loss:

No wt loss reported, reported wt gain in past 6 mo. 

Fat loss: Mild, eyes- slightly hollow look

Muscle loss: Mild, temple- slight hollowing 

Supporting Evidence:

Fluid accumulation: None 

Functional Status: unable to evaluate





Nutrition Prescription (Diet Order): NPO



Estimated Nutritional Needs:

7474-3655 calories/day (25-35 kcal/kg CBW)

60-90 g protein/day (1-1.5 g pro/kg CBW)





Diet Adequacy:

Not meeting calorie needs, Not meeting protein needs, meeting fluid needs 





Diet Education Needs Assessment:

Diet education not indicated, patient on temporary/transition diet.



Nutrition Care Level: Mod 



Nutrition Diagnosis: Inadequate energy and protein intake related to bowel perforation 
requiring ileocolectomy as evidenced by pt remaining NPO after surgery and not meeting 
needs. 





Goal: Patient will meet % of estimated needs by follow up 

Progress: not progressing 



Interventions:

fiber modified diet,Recommended Modifications, Skill Development,  Collaboration with other 
providers



Monitoring/Evaluation:

Total energy intake, Total protein intake, Modified diet





Signed: Nayeli Mike RD, LD, CNSC

## 2019-10-22 VITALS — SYSTOLIC BLOOD PRESSURE: 144 MMHG | DIASTOLIC BLOOD PRESSURE: 66 MMHG

## 2019-10-22 VITALS — SYSTOLIC BLOOD PRESSURE: 152 MMHG | DIASTOLIC BLOOD PRESSURE: 69 MMHG

## 2019-10-22 VITALS — SYSTOLIC BLOOD PRESSURE: 162 MMHG | DIASTOLIC BLOOD PRESSURE: 66 MMHG

## 2019-10-22 VITALS — DIASTOLIC BLOOD PRESSURE: 71 MMHG | SYSTOLIC BLOOD PRESSURE: 162 MMHG

## 2019-10-22 VITALS — SYSTOLIC BLOOD PRESSURE: 161 MMHG | DIASTOLIC BLOOD PRESSURE: 71 MMHG

## 2019-10-22 VITALS — SYSTOLIC BLOOD PRESSURE: 162 MMHG | DIASTOLIC BLOOD PRESSURE: 71 MMHG

## 2019-10-22 LAB
ANION GAP SERPL CALC-SCNC: 12.7 MMOL/L (ref 8–16)
BUN SERPL-MCNC: 14 MG/DL (ref 7–26)
BUN/CREAT SERPL: 12 (ref 6–25)
CALCIUM SERPL-MCNC: 8.6 MG/DL (ref 8.4–10.2)
CHLORIDE SERPL-SCNC: 110 MMOL/L (ref 98–107)
CO2 SERPL-SCNC: 21 MMOL/L (ref 22–29)
EGFRCR SERPLBLD CKD-EPI 2021: 46 ML/MIN (ref 60–?)
GLUCOSE SERPLBLD-MCNC: 76 MG/DL (ref 74–118)
POTASSIUM SERPL-SCNC: 3.7 MMOL/L (ref 3.5–5.1)
SODIUM SERPL-SCNC: 140 MMOL/L (ref 136–145)

## 2019-10-22 RX ADMIN — SODIUM CHLORIDE SCH MLS/HR: 900 INJECTION INTRAVENOUS at 06:00

## 2019-10-22 RX ADMIN — SODIUM CHLORIDE SCH MLS/HR: 9 INJECTION, SOLUTION INTRAVENOUS at 01:28

## 2019-10-22 RX ADMIN — SODIUM CHLORIDE SCH MLS/HR: 900 INJECTION INTRAVENOUS at 04:42

## 2019-10-22 RX ADMIN — SODIUM CHLORIDE SCH MLS/HR: 9 INJECTION, SOLUTION INTRAVENOUS at 09:09

## 2019-10-22 RX ADMIN — SODIUM CHLORIDE SCH MLS/HR: 900 INJECTION INTRAVENOUS at 11:03

## 2019-10-22 RX ADMIN — AMLODIPINE BESYLATE SCH MG: 5 TABLET ORAL at 09:08

## 2019-10-22 NOTE — PROGRESS NOTE
DATE:  10/22/2019  

 

CONSULTING PHYSICIANS:  

1. Dr. Kovacs with Cardiology.

2. Dr. Sanchez with GI.

3. Dr. Grossman with Surgery.

 

CHIEF COMPLAINT:  Abdominal, status post bowel resection.

 

SUBJECTIVE:  The patient is sitting up in a chair tolerating her diet, abdominal
pain is

much improved.  She denies any chest pain, nausea, vomiting, or diarrhea. 

 

PHYSICAL EXAMINATION:

VITAL SIGNS:  Temperature 98.7, pulse is 81, respirations 18, blood pressure 
144/66, and

pulse ox is 97% on room air. GENERAL:  No acute distress. 

HEENT:  Normocephalic, atraumatic. 

NECK:  Supple and midline. 

CARDIOVASCULAR:  Regular rate and rhythm. 

LUNGS:  Clear to auscultation. 

ABDOMEN:  Soft and nontender. 

NEUROLOGIC:  Alert, awake, and oriented x3. MUSCULOSKELETAL:  Moves all 
extremities. 

SKIN:  Dry and intact.

LABORATORY DATA:  Sodium 140, potassium is 3.7, carbon dioxide is 21, creatinine
is

1.18, and estimated GFR is 46. 

 

IMPRESSION:  

1. Status post bowel resection for a perforated bowel.  Tolerating full liquid 
diet.  GI

on the case. 

2. Acute kidney injury, improving, creatinine of 1.1.

3. Iron deficiency anemia, status post Venofer.  Continue monitoring H and H 
closely.

4. Hypertension.  Stable on current medications.

5. History of irritable bowel syndrome.  GI on the case.  We will continue home

medications. 

6. History of congestive heart failure, stable.  Continue current medication.

Cardiology on the case. 

7. Deep vein thrombosis prophylaxis.  No chemical anticoagulation due to anemia.

 

PLAN:  The patient has been cleared for discharge per Dr. Grossman.  Advised to
follow

up with Dr. Grossman, Dr. Sanchez, and PCP in 1 to 2 weeks. 

 

 

Dictated by CHAPIN Trotter

 

______________________________

Madhuri Garibay MD

 

MY/MODL

D:  10/22/2019 13:52:58

T:  10/22/2019 15:40:17

Job #:  [id number]

 

Seen and examined. Agree with the findings and plan as documented by CHAPIN White.

MTDD

## 2020-03-03 LAB
ALBUMIN SERPL-MCNC: 3.6 G/DL (ref 3.5–5)
ALBUMIN/GLOB SERPL: 0.9 {RATIO} (ref 0.8–2)
ALP SERPL-CCNC: 75 IU/L (ref 40–150)
ALT SERPL-CCNC: 9 IU/L (ref 0–55)
ANION GAP SERPL CALC-SCNC: 9.5 MMOL/L (ref 8–16)
BASOPHILS # BLD AUTO: 0.1 10*3/UL (ref 0–0.1)
BASOPHILS NFR BLD AUTO: 1.7 % (ref 0–1)
BUN SERPL-MCNC: 20 MG/DL (ref 7–26)
BUN/CREAT SERPL: 19 (ref 6–25)
CALCIUM SERPL-MCNC: 8.7 MG/DL (ref 8.4–10.2)
CHLORIDE SERPL-SCNC: 108 MMOL/L (ref 98–107)
CHOLEST SERPL-MCNC: 146 MD/DL (ref 0–199)
CHOLEST/HDLC SERPL: 2.1 {RATIO} (ref 3–3.6)
CO2 SERPL-SCNC: 25 MMOL/L (ref 22–29)
DEPRECATED NEUTROPHILS # BLD AUTO: 2.6 10*3/UL (ref 2.1–6.9)
EGFRCR SERPLBLD CKD-EPI 2021: 54 ML/MIN (ref 60–?)
EOSINOPHIL # BLD AUTO: 0.2 10*3/UL (ref 0–0.4)
EOSINOPHIL NFR BLD AUTO: 3.6 % (ref 0–6)
ERYTHROCYTE [DISTWIDTH] IN CORD BLOOD: 15 % (ref 11.7–14.4)
GLOBULIN PLAS-MCNC: 4 G/DL (ref 2.3–3.5)
GLUCOSE SERPLBLD-MCNC: 72 MG/DL (ref 74–118)
HCT VFR BLD AUTO: 32.5 % (ref 34.2–44.1)
HDLC SERPL-MSCNC: 69 MG/DL (ref 40–60)
HGB BLD-MCNC: 10.2 G/DL (ref 12–16)
LDLC SERPL CALC-MCNC: 55 MG/DL (ref 60–130)
LYMPHOCYTES # BLD: 1.8 10*3/UL (ref 1–3.2)
LYMPHOCYTES NFR BLD AUTO: 34.5 % (ref 18–39.1)
MCH RBC QN AUTO: 29.2 PG (ref 28–32)
MCHC RBC AUTO-ENTMCNC: 31.4 G/DL (ref 31–35)
MCV RBC AUTO: 93.1 FL (ref 81–99)
MONOCYTES # BLD AUTO: 0.6 10*3/UL (ref 0.2–0.8)
MONOCYTES NFR BLD AUTO: 10.7 % (ref 4.4–11.3)
NEUTS SEG NFR BLD AUTO: 49.3 % (ref 38.7–80)
PLATELET # BLD AUTO: 265 X10E3/UL (ref 140–360)
POTASSIUM SERPL-SCNC: 4.5 MMOL/L (ref 3.5–5.1)
RBC # BLD AUTO: 3.49 X10E6/UL (ref 3.6–5.1)
SODIUM SERPL-SCNC: 138 MMOL/L (ref 136–145)
TRIGL SERPL-MCNC: 109 MG/DL (ref 0–149)
TSH SERPL DL<=0.005 MIU/L-ACNC: 1.83 UIU/ML (ref 0.35–4.94)

## 2020-03-03 NOTE — DIAGNOSTIC IMAGING REPORT
EXAMINATION:  CHEST 2 VIEWS    



INDICATION:      

^PREOP FOR LEFT HEART CATHETERIZATION

^[id number]

^[id number]



COMPARISON:  10/14/2019

     

FINDINGS:  PA and lateral views



TUBES and LINES:  None.



LUNGS:  Lungs are well inflated.  There is mild perihilar interstital

opacities, consistent with interstitial edema.



PLEURA:  No pleural effusion or pneumothorax.



HEART AND MEDIASTINUM:  The cardiomediastinal silhouette is unremarkable.    



BONES AND SOFT TISSUES:  No acute osseous lesion.  Soft tissues are

unremarkable.



UPPER ABDOMEN: No free air under the diaphragm.    



IMPRESSION: 

Mild interstitial edema





Signed by: Laith Almanza MD on 3/3/2020 7:20 PM

## 2020-03-05 NOTE — NUR
At approximately 1525 Dr. Kovacs notified of Chest x-ray results: mild interstitial Edema. 
Dr. Kovacs requested to have lab results and chest x ray results faxed to his office at 
[phone]. 

At 1531 Attempted to fax Lab results and Chest x-ray results. Error message stating line was 
"Busy".

At 1626, attempted to fax lab reports amd chest x-ray results again.  Result Message "OK".

## 2020-03-07 NOTE — PRE OP HISTORY & PHYSICAL
The patient will be presenting on 2020, for cardiac catheterization with

possible intervention. 

 

HISTORY OF PRESENT ILLNESS:  A 64-year-old lady, known with hypertension,

hyperlipidemia, diabetes mellitus, severe gastritis and ulcer and repeated necrotic

bowel problems.  The patient also known to have hematuria and other medical health

problems.  In 2018, she had emergency surgery for necrotic bowel.  Again, this

problem recurred in 2019 and she had surgery.  The patient is seen and evaluated

and she was sent to our service for an evaluation of major abdominal vascular surgery at

[facility].  The patient in need for cardiac clearance.  The patient is

known to have abnormal nuclear stress test dated back to 2019, treated medically

because of her severe GI issue and GI symptoms.  Her symptoms are class III shortness of

breath on exertion, chest tightness, chest pressure, relieved by rest.  Her activities

are improving since her latest surgery. 

 

Really truly, her major problem is her GI issue.  She still continues to have more than

10 bouts of loose bowel movements and diarrhea.  She is on multiple medical therapy.

The patient is really quite symptomatic from her GI issues.  She used to have severe

diabetes with her 2 necrotic bowel surgeries and so she lost 100 pounds and she is not

on any medication now for her diabetes. 

 

CURRENT MEDICATIONS:  Lovastatin 20 mg a day, atenolol 25 mg twice a day, lisinopril 40

mg a day, amlodipine 5 mg a day, dicyclomine 20 mg q.i.d., colestipol 1 g four tablets

twice a day, Carafate 1 g q.i.d., ondansetron 8 mg p.r.n., Pepcid 20 mg twice a day. 

 

ALLERGIES:  LIPITOR.

 

PAST MEDICAL HISTORY:  

1. Diabetes mellitus, now diet controlled.  She has lost quite a lot of weight in the

last couple of years or so. 

2. Hypertension.

3. Chronic renal disease with proteinuria.

4. History of Crohn disease and necrotic bowel twice with surgery in 2018 and

2019, in need for more surgery. 

5. History of major hematuria in 2018.

 

SOCIAL HISTORY:  She is a nonsmoker.  She was smoking a few years back.  She is single.

She is non-alcohol drinker. 

 

FAMILY HISTORY:  Father  at age 65 with complication of alcoholism.  Mother  in

her 70s with kidney and liver disease.  She was also diabetic.  No children.  Six

siblings, 2 brothers already had open-heart surgery. 

 

REVIEW OF SYSTEMS:

GENERAL:  No fever, no chills.  Barely maintaining her weight.  She lost quite a lot of

weight, but recently she is trying to stabilize her weight. 

HEENT:  No vision problem.  No hearing problem. 

CARDIAC:  As per above. 

PULMONARY:  As per above. 

GI:  Quite a lot of symptoms including GERD, nausea, vomiting, diarrhea, and a very

abnormal pattern of bowel movement. 

HEMATOLOGY:  Easy bruising, but no bleeding. 

NEUROLOGY:  No seizure activity.  No localized weakness.

 

PHYSICAL EXAMINATION:

VITAL SIGNS:  Height of 5 feet 3 inches, weight of 114 pounds.  Blood pressure 120/80,

heart rate of 60, respiratory rate of 18. 

HEENT:  Pupils are reactive. 

NECK:  No elevation of jugular venous pulsation.  No bruit. 

CHEST:  Clear to auscultation and percussion. 

HEART:  PMI in 5th left intercostal space.  Normal first and second heart sounds with

ejection systolic murmur. 

ABDOMEN:  Soft.  Multiple scars. 

EXTREMITIES:  No cyanosis, no clubbing, no edema.

IMPRESSION AND PLAN:  

1. Coronary artery disease with abnormal nuclear stress test dated 10/08/2019.  The

patient treated medically. 

2. Coronary artery disease with stable angina.

3. Hypertension.

4. Diabetes mellitus, diet controlled.

5. Aortic valve disease with calcified valve and moderate aortic stenosis with mean

gradient of 20 mmHg, valve area of 1.3 sq cm. 

6. Hypertension.

7. Hypercholesteremia.

8. Problem with her gastrointestinal tract and she is in need of major vascular

abdominal surgery. 

The patient will be presenting for cardiac catheterization with possible intervention.

Procedure risks, benefits, and alternatives are discussed and explained. 

 

 

 

 

______________________________

MD SHANNEN Oswald/MODL

D:  2020 17:29:59

T:  2020 01:29:35

Job #:  [id number]

## 2020-03-09 ENCOUNTER — HOSPITAL ENCOUNTER (OUTPATIENT)
Dept: HOSPITAL 88 - CATH LAB | Age: 65
Discharge: HOME | End: 2020-03-09
Attending: INTERNAL MEDICINE
Payer: COMMERCIAL

## 2020-03-09 VITALS — DIASTOLIC BLOOD PRESSURE: 51 MMHG | SYSTOLIC BLOOD PRESSURE: 145 MMHG

## 2020-03-09 VITALS — DIASTOLIC BLOOD PRESSURE: 55 MMHG | SYSTOLIC BLOOD PRESSURE: 154 MMHG

## 2020-03-09 VITALS — DIASTOLIC BLOOD PRESSURE: 64 MMHG | SYSTOLIC BLOOD PRESSURE: 177 MMHG

## 2020-03-09 VITALS — DIASTOLIC BLOOD PRESSURE: 61 MMHG | SYSTOLIC BLOOD PRESSURE: 163 MMHG

## 2020-03-09 VITALS — DIASTOLIC BLOOD PRESSURE: 60 MMHG | SYSTOLIC BLOOD PRESSURE: 154 MMHG

## 2020-03-09 VITALS — DIASTOLIC BLOOD PRESSURE: 60 MMHG | SYSTOLIC BLOOD PRESSURE: 162 MMHG

## 2020-03-09 VITALS — WEIGHT: 147 LBS | HEIGHT: 63.5 IN | BODY MASS INDEX: 25.72 KG/M2

## 2020-03-09 VITALS — SYSTOLIC BLOOD PRESSURE: 175 MMHG | DIASTOLIC BLOOD PRESSURE: 59 MMHG

## 2020-03-09 VITALS — DIASTOLIC BLOOD PRESSURE: 51 MMHG | SYSTOLIC BLOOD PRESSURE: 136 MMHG

## 2020-03-09 VITALS — SYSTOLIC BLOOD PRESSURE: 166 MMHG | DIASTOLIC BLOOD PRESSURE: 55 MMHG

## 2020-03-09 VITALS — SYSTOLIC BLOOD PRESSURE: 139 MMHG | DIASTOLIC BLOOD PRESSURE: 48 MMHG

## 2020-03-09 VITALS — DIASTOLIC BLOOD PRESSURE: 61 MMHG | SYSTOLIC BLOOD PRESSURE: 172 MMHG

## 2020-03-09 VITALS — SYSTOLIC BLOOD PRESSURE: 177 MMHG | DIASTOLIC BLOOD PRESSURE: 64 MMHG

## 2020-03-09 VITALS — DIASTOLIC BLOOD PRESSURE: 65 MMHG | SYSTOLIC BLOOD PRESSURE: 173 MMHG

## 2020-03-09 DIAGNOSIS — I25.118: Primary | ICD-10-CM

## 2020-03-09 DIAGNOSIS — I50.9: ICD-10-CM

## 2020-03-09 DIAGNOSIS — I70.291: ICD-10-CM

## 2020-03-09 DIAGNOSIS — N18.9: ICD-10-CM

## 2020-03-09 DIAGNOSIS — I13.0: ICD-10-CM

## 2020-03-09 DIAGNOSIS — R80.9: ICD-10-CM

## 2020-03-09 DIAGNOSIS — R94.39: ICD-10-CM

## 2020-03-09 DIAGNOSIS — E78.00: ICD-10-CM

## 2020-03-09 DIAGNOSIS — Z01.818: ICD-10-CM

## 2020-03-09 DIAGNOSIS — I35.0: ICD-10-CM

## 2020-03-09 DIAGNOSIS — Z01.812: ICD-10-CM

## 2020-03-09 DIAGNOSIS — Z82.49: ICD-10-CM

## 2020-03-09 DIAGNOSIS — K92.9: ICD-10-CM

## 2020-03-09 DIAGNOSIS — E11.22: ICD-10-CM

## 2020-03-09 PROCEDURE — 99153 MOD SED SAME PHYS/QHP EA: CPT

## 2020-03-09 PROCEDURE — 85025 COMPLETE CBC W/AUTO DIFF WBC: CPT

## 2020-03-09 PROCEDURE — 93458 L HRT ARTERY/VENTRICLE ANGIO: CPT

## 2020-03-09 PROCEDURE — 80061 LIPID PANEL: CPT

## 2020-03-09 PROCEDURE — 71046 X-RAY EXAM CHEST 2 VIEWS: CPT

## 2020-03-09 PROCEDURE — 84443 ASSAY THYROID STIM HORMONE: CPT

## 2020-03-09 PROCEDURE — 80053 COMPREHEN METABOLIC PANEL: CPT

## 2020-03-09 PROCEDURE — 99152 MOD SED SAME PHYS/QHP 5/>YRS: CPT

## 2020-03-09 PROCEDURE — 36415 COLL VENOUS BLD VENIPUNCTURE: CPT

## 2020-03-09 PROCEDURE — 75625 CONTRAST EXAM ABDOMINL AORTA: CPT

## 2020-03-09 RX ADMIN — CLONIDINE HYDROCHLORIDE ONE MG: 0.1 TABLET ORAL at 10:21

## 2020-03-09 RX ADMIN — DIPHENHYDRAMINE HYDROCHLORIDE ONE MG: 50 INJECTION INTRAMUSCULAR; INTRAVENOUS at 07:40

## 2020-03-09 NOTE — XMS REPORT
Summary of Care

                             Created on: 2020



Viky Ho

External Reference #: [id number]

: 1955

Sex: Female



Demographics







                          Address                   [address] [place] [address]  [address]

 

                          Home Phone                [phone]

 

                          Preferred Language        English

 

                          Marital Status            Single

 

                          Advent Affiliation     Unknown

 

                          Race                      Unknown

 

                          Ethnic Group              Non-





Author







                          Author                    [facility]

 

                          Organization              [facility]

 

                          Address                   Unknown

 

                          Phone                     Unavailable







Support







                Name            Relationship    Address         Phone

 

                Anne Prater    [employer]            Unknown         [phone]







Care Team Providers







                    Care Team Member Name    Role                Phone

 

                          PCP                       Unavailable







Reason for Referral

* Consult, Test & Treat (Routine)





                          Referred By Contact       Referred To Contact



                 Status          Reason          Specialty       Diagnoses /  



                                         Procedures  

 

                                        



Merline Dunlap MD



[address] [place] [address]



Phone: [phone]



Fax: [phone]                       



Beto Davila MD



[address] [facility]



[address], [address]



[place] [address]



Phone: [phone]



Fax: [phone]



                 Pending         Consult, Test, and     Urology         Diagnoses  



                           Treat                     Hematuria,  



                                         unspecified type  



                                         Consult, Test, and  



                                         Treat referred by  



                                         Dr. Dunlap;  



                                         Hematuria,  



                                         unspecified type

  



                                         P  



                                         rocedures  



                                         [place] OFFICE  



                                         OUTPATIENT NEW 30  



                                         MINUTES  











Reason for Visit

* 





 



                           Reason                    Comments

 

 



                                         Initial Visit 









Encounter Details







                          Care Team                 Description



                     Date                Type                Department  

 

                                        



Merline Dunlap MD



[address] [place] [address] [phone] (Fax)                      Initial Visit



                     2020          Office Visit        [facility]  



                                         [facility]  



                                         6th Floor, [address]  



                                         [place] [address] [phone]  







Allergies







                                        Comments



                 Active Allergy     Reactions       Severity        Noted Date 

 

                                         



                           Atorvastatin Calcium       2020 



documented as of this encounter (statuses as of 2020)



Medications

No known medicationsdocumented as of this encounter (statuses as of 2020)



Active Problems





No known active problemsdocumented as of this encounter (statuses as of 
2020)



Social History







                                        Date



                 Tobacco Use     Types           Packs/Day       Years Used 

 

                                        Quit: 2018



                                         Former Smoker    

 

    



                                         Smokeless Tobacco: Never   



                                         Used   









                    Drinks/Week         oz/Week             Comments



                                         Alcohol Use   

 

                                                             



                                         Not Currently   









 



                           Sex Assigned at Birth     Date Recorded

 

 



                                         Not on file 









                                        Industry



                           Job Start Date            Occupation 

 

                                        Not on file



                           Not on file               Not on file 









                                        Travel End



                           Travel History            Travel Start 

 





                                         No recent travel history available.



documented as of this encounter



Last Filed Vital Signs







                    Reading             Time Taken          Comments



                                         Vital Sign   

 

                    182/66              2020  1:44 PM CST     



                                         Blood Pressure   

 

                    72                  2020  1:44 PM CST     



                                         Pulse   

 

                    -                   -                    



                                         Temperature   

 

                    -                   -                    



                                         Respiratory Rate   

 

                    -                   -                    



                                         Oxygen Saturation   

 

                    -                   -                    



                                         Inhaled Oxygen   



                                         Concentration   

 

                    64.4 kg (142 lb)    2020  1:44 PM CST     



                                         Weight   

 

                    160 cm (5' 3")      2020  1:44 PM CST     



                                         Height   

 

                    25.15               2020  1:44 PM CST     



                                         Body Mass Index   



documented in this encounter



Patient Instructions

* Patient Instructions* 



 Rashaad Bobby CMA - 2020  2:47 PM CST



Thank you for choosing [facility].



You may receive a survey in the mail. Please provide comments to let us know how
we can improve our patient care.



Instructions for your care:



Patient will follow up in 2 weeks with referral to Urology Dr Beto Dunlap MD [facility]

Associate Professor of Surgery

Division of Vascular Surgery and Endovascular Therapy



If you have any questions, please feel free to call us at: [phone]







Electronically signed by Rashaad Bobby CMA at 2020  2:48 PM CST





documented in this encounter



Progress Notes

* Luis Miguel James - 2020  2:00 PM CST



Formatting of this note might be different from the original.

Division of Vascular Surgery & Endovascular Therapy

[facility] Department of Surgery

                [facility], [place], [place]          



HISTORY & PHYSICAL EXAM FOR OUTPATIENT NEW VISIT 

FOR EVALUATION OF ABDOMINAL PAIN DUE TO MESENTERIC ISCHEMIA 



DATE OF VISIT: 2020

PATIENT NAME: Viky Ho

: 1955;       AGE: 64 y.o.;      Sex:F 

PHONE NUMBER: [phone] (Home);  (Work); [phone] (Cell)

MRN: [id number];    #:  [id number]

PHYSICIAN: Miles James NP

PRIMARY CARE / REFERRING PHYSICIAN: Thanh Grossman / No primary care prov
ider on file. / No primary physician on file. / 



REASON FOR EVALUATION / CHIEF COMPLAINT: EVALUATION AND TREATMENT OF MESENTERIC 
ISCHEMIA



HISTORY OF PRESENT ILLNESS:  Viky Ho is a 64 y.o. female patient with PMH
of anemia, HTN and Type II DM who presents for evaluation and treatment of abdo
alyson pain and weight loss which has been persistent for the past 2 years. The p
atient was referred here by her surgeon, Dr. Grossman, in [place]. Patient sta
michael in 2018 she had ex lap for removal of ischemic bowel and gall silva
dder removal and later in 2019 she had to have abdominal surgery for per
forated bowel. During a routine CT scan she was told that she had occlusion of t
he arteries to her bowels and therefore was referred to vascular surgery. Overal
l since the onset of the abdominal symptoms, the patient reports her abdominal p
ain condition has waxed and waned but are better overall. The patient reports th
at her abdominal symptoms has affected her lifestyle and her ability to perform 
her daily routine or job. The patient's risk factors for atherosclerotic occlusi
ve disease include: dyslipidemia, diabetes mellitus and hypertension. The patien
t has not prior endovascular intervention of her mesenteric artery. Evaluation t
o date has included CT angiogram which revealed presence of high grade stenosis 
of 

celiac artery and superior mesenteric artery. The patient was referred for a vas
cular surgery consultation to further evaluate and treat the abdominal symptoms 
due to possible mesenteric ischemia.



PAST SURGICAL HISTORY: The patient's  has no past surgical history on file.



PAST MEDICAL HISTORY: The patient  has no past medical history on file.



FAMILY HISTORY: The patient's family history is not on file.



CURRENT MEDICATIONS: 

No outpatient medications prior to visit. 



No facility-administered medications prior to visit.  



Allergies not on file



SOCIAL HISTORY:  reports that she quit smoking about 18 months ago. She has neve
r used smokeless tobacco. She reports previous alcohol use. She reports that she
does not use drugs.



FAMILY HISTORY: family history is not on file.



REVIEW OF SYSTEMS:

General: negative for - night sweats, sleep disturbance, weight gain or weight l
oss 

Psychological: negative for - anxiety, memory difficulties, mood swings or sleep
disturbances 

Ophthalmic: negative for - decreased vision, double vision or loss of vision 

ENT: negative for - headaches, nasal congestion, oral lesions, sinus pain, visua
l changes, earache, ear discharge, or tinnitus.

Neck: negative for - neck pain, neck stiffness, swollen gland, or thyroid enlarg
ement.

Endocrine: negative for - Malaise/lethargy, polyurea 

Breast: negative for - galactorrhea, nipple changes or nipple discharge, 

Respiratory: no cough, shortness of breath, wheezing, dyspnea, sputum production
, or hemoptysis. 

Cardiovascular: no chest pain, orthopnea, heart murmur, or dyspnea on exertion 

Gastrointestinal: positive for abdominal pain, change in bowel habits. Negative 
for jaundice, constipation, or black or bloody stools 

Genito-Urinary: no dysuria, trouble voiding, or hematuria 

Musculoskeletal: negative for - joint stiffness, joint swelling or bone pain 

Neurological: negative for - behavioral changes, headaches, impaired coordinatio
n, loss of balance, tingling sensation, dizziness, seizure, memory loss or weakn
ess 

Dermatological: negative for - mole changes, nail changes, skin lesion changes, 
or skin discoloration.

Hematological and Lymphatic: negative for - bleeding problems, blood clots, brui
sing, fatigue, swollen lymph nodes or history of anemia. 

Endocrine: negative for thyroid disease, heat or cold intolerance, polyuria, or 
polydipsia

Psychiatric: negative for depression, sleep disturbance, suicidal ideation, anxi
ety disorder, or history of hallucinations



VITAL SIGNS: /66  | Pulse 72  | Ht 5' 3" (1.6 m)  | Wt 142 lb (64.4 kg)  |
BMI 25.15 kg/m .



PHYSICAL EXAM: 

Constitutional: Well nourished, no signs of distress 

HENT: Non icteric sclerae, oropharynx clear. Normocephalic and atraumatic.  Cran
ial nerves II-XI are grossly intact.  Pupils are equally reactive to light.  Ext
raocular motor intact.

Lymphadenopathy: She has no cervical, supraclavicular or axillary adenopathy, 

Cardiovascular: Normal rate, regular rhythm and normal heart sounds. No murmurs 
, rubs or gallops 

Pulmonary/Chest: Breath sounds normal. No respiratory distress. No adventitious 
sounds. 

Abdominal: Soft. Noabdominal distension. No abdominal tenderness. No organomegal
y. No abdominal pulsatile mass noted.

Musculoskeletal: Normal range of motion. No evidence of arthritis. 

Extremities: No edema, cyanosis or clubbing.

Neurological: She is alert and oriented. No muscle weakness and normal gait.

VASCULAR:  Palpable femoral pulses were present bilaterally with palpable poplit
eal, dorsalis pedis and posterior tibial artery pulse.  No femoral or abdominal 
pulsatile mass noted.  



Diagnostic Evaluation: The patient had diagnostic testing including CT angiogram
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
mesenteric artery. 



ASSESSMENT / PLAN:  



Abdominal pain due to mesenteric artery stenosis (ICD-9-CM code: 557.1)  We jenna
mmneded for the patient to have cardiac workup and clearance with Dr. Vallecillo, gabriella urss cardiologist in [place]. The patient did have an episode of urinary tract ble
eding while having a cardiac catherization and therefore we will have her follow
up with urology for evaluation. We will also obtain CTA study from [facility].
Once this has been completed we will bring in patient and discuss options for 
treatment and management. 



Total duration of this clinic visit was 60 minutes, during which more than 30 mi
nutes was devoted to counseling and coordination of care.  (CPT code: 86036)



STEFFANIE [facility]IRGIN[facility] 

Department of Surgery 

Division of Vascular Surgery 



 







Electronically signed by Luis Miguel James NP at 2020  2:45 PM CST

* Merline Dunlap MD - 2020  2:00 PM CST



Ms Ho is a 65 yo female with chronic mesenteric ischemia, with significan
t weight loss over the past two years from 212 to 99 lbs.  She has had to underg
o emergent bowel resection for intestinal ischemia in 10/2018, and a x-lap for b
owel perforation last year, with cholecystectomy that time.  She denies any kayli
dication or rest pain, or lower extremity ulceration.  (+) stress test, but no c
urrent symptoms.  She did have a bout of acute CHF exacerbation last year, requi
ring emergent admission, diuretic therapy.  Her course has also been complicated
by urinary tract bleeding, (one episode) that has required her to be off of asa 
or plavix.  She is currently not on any antiplatelet or anticoagulant therapy.  
Rec:  1)  F/u with Dr. Vallecillo (cardiology) in [facility]  2)  Obtain CTA from 
[facility]  3)  F/u with urology to evaluate for bleeding.  Pt currently still
having food fear and chronic abdominal pain.  Review of some of the non-contrast
images that were sent over revealed a coral reef covering the ostia of the albaro
iac and the sma, with extensive calcifications of the entire infrarenal aorta an
d bilateral iliacs, and suprarenally.  F/u in 2-3 weeks.



Electronically signed by Merline Dunlap MD at 2020  2:45 PM CST

documented in this encounter



Plan of Treatment







                          Care Team                 Description



                     Date                Type                Specialty  

 

                                        



Link, Beto GUPTA MD



[address] [facility]



[address]LO[address], [address]



[place] [address] [phone] (Fax)                       



                     2020          Office Visit        Urology  









                                        Order Schedule



                 Name            Type            Priority        Associated Diagnoses 

 

                                        Ordered: 2020



                 AMB REF TO UROLOGY [facility]     Outpatient      Routine         Hematuria, unspecified 



                           Referral                  type 









   



                 Health Maintenance     Due Date        Last Done       Comments

 

   



                           COLON CANCER SCREENIN1955  



                                         COLONOSCOPY   

 

   



                           MAMMOGRAM ANNUAL          1955  

 

   



                           TETANUS SHOT (ADULT)      1972  

 

   



                           BMI FOLLOW UP PLAN        1975  

 

   



                           HEPATITIS C SCREENING     1975  

 

   



                           HIV SCREENING             1975  

 

   



                           CERVICAL CANCER SCREENING     1978  



                                         3 YEAR FOLLOW UP   

 

   



                           FLU VACCINE > 6 MONTHS     2019  



documented as of this encounter



Results

Not on filedocumented in this encounter



Visit Diagnoses











                                         Diagnosis

 





                                         Hematuria, unspecified type - Primary

 





                                         Abdominal pain, unspecified abdominal location



documented in this encounter



Insurance







                                        Type



            Payer      Benefit     Subscriber ID     Effective     Phone      Address 



                           Plan /                    Dates   



                                         Group     

 

                                        PPO



              [employer] [facility]     OPEN         xxxxxxxxx     2020-P      [address] 



                     [address]              resent              [id number] 



                           [place] -                    [place] 



                                         [id number] 









     



            Guarantor Name     Account     Relation to     Date of     Phone      Billing Address



                     Type                Patient             Birth  

 

     



            Viky Ho     Personal/F     Self       1955     [phone]     [address]               ([place])              [place] [address]



documented as of this encounter

## 2020-03-09 NOTE — OPERATIVE REPORT
DATE OF PROCEDURE:  

 

SURGEON:  Damon Kovacs MD

 

TITLE OF THE PROCEDURES:  

1. Left cardiac catheterization.

2. Abdominal angiogram.

 

INDICATIONS:  Angina, CHF, aortic stenosis, peripheral arterial-vascular disease.

 

TECHNICAL DETAILS:  After the usual sterile preparation and draping procedure,

intravenous Versed and fentanyl given for sedation, local xylocaine for anesthesia.  A

4-Canadian sheath was established in the right common femoral artery.  The wire was

advanced at the common iliac level.  Angiogram showed 99% blockage with severely

calcified valve.  Access was obtained on the left common femoral, Nanci left 4 and

3DRC catheters to engage the coronary, pigtail for hemodynamic measurement and left

ventriculogram.  Then subsequently a tennis racquet was placed in the abdominal aorta

using digital subtraction technique and abdominal angiogram was done.  The patient

tolerated the procedure.  Sheaths were removed manually.  There were no complication and

the patient tolerated the procedures. 

 

RESULTS:  A.  Coronary angiogram:

1. Left main, 20% ostial disease.

2. LAD, 30% ostial followed at 95% heavily calcified lesion at origin of sizable

diagonal, several plaques at 50% with heavy calcification. 

3. Circumflex coronary artery, codominant circulation giving also PDA from that artery

with 30% lesion. 

4. Right coronary artery, 70% mid RCA lesion, codominant circulation.

a. Hemodynamic:  Aorta pressure 172/62, LV pressure 185/30 with gradient across the

aortic valve of 30 mmHg. 

 

Left ventriculogram in the right anterior oblique view showed akinetic anterior and

apical segment with an ejection fraction of 35%. 

 

Other finding, calcified aorta. 

 

Abdominal angiogram, heavily calcified abdominal aorta, complex 99% to very calcified

right common iliac disease, gradient of 80 mm between the abdominal aorta and the right

common femoral.  Minimal plaquing in the left system. 

 

IMPRESSION:  

1. Two-vessel coronary artery disease.

2. Left ventricular dysfunction with an ejection fraction of 35%.

3. A gradient of 13 mmHg across the aortic valve.

4. Severe peripheral vascular disease with heavily calcified aorta complex, subtotal

right common iliac disease. 

 

RECOMMENDATIONS:  Evaluation of the findings with the patient, her vascular surgeon, and

formulating a plan, very complex case, potential for high complication for intervention. 

 

COMPLICATIONS:  None.

 

BLOOD LOSS:  None.

 

 

 

 

______________________________

MD SHANNEN Oswald/RADHAL

D:  03/09/2020 09:31:48

T:  03/09/2020 13:41:43

Job #:  [id number]

## 2020-03-09 NOTE — NUR
Patient ambulated to restroom with standby assistance and void without difficulty. Patient 
ambulated back to ACU bay 9. No distress noted. 



Bilateral groin dressings assessed: clean,dry, and intact. Right groin soft upon palpation 
and patient checked right groin area and reported soft upon palpation. Patient palpated left 
groin and reported "slight pain when pressed down" on medial aspect of left thigh. Left 
groin palpated by myself and  felt slightly firm and patient stated she felt pain when 
palpated. Rechecked around area of left groin and area no longer felt firm and patient 
denied any pain. Patient palpated left groin and stated she did not have any pain and site 
was not firm. Patient encouraged to monitor area and to contact physician if firmness 
reoccurs. Patient verbalized understanding.



IV to left hand removed and dressing placed per unit protocol. Dressing to left hand is 
clean,dry, and intact.



Patient discharged unit via wheelchair with patient's sister Anne DORANTES as . Patient 
discharged with belongings. No distress noted at time of discharge.

## 2020-03-09 NOTE — NUR
Patient straight cathed as ordered. Patient void approximately 950ml clear yellow urine. 
Patient appears to have tolerated well. Patient awake,alert, and orientedx3. Respirations 
even and unlabored on room air. No distress noted at this time. Patient continues to lay 
flat. Bilateral groin dressings are dry and intact. Call light within reach. Stretcher in 
low and locked position with siderails elevatedx2.

## 2023-11-20 ENCOUNTER — HOSPITAL ENCOUNTER (OUTPATIENT)
Dept: HOSPITAL 97 - EKG | Age: 68
Discharge: HOME | End: 2023-11-20
Attending: INTERNAL MEDICINE
Payer: COMMERCIAL

## 2023-11-20 DIAGNOSIS — E11.22: ICD-10-CM

## 2023-11-20 DIAGNOSIS — Z88.8: ICD-10-CM

## 2023-11-20 DIAGNOSIS — Z87.891: ICD-10-CM

## 2023-11-20 DIAGNOSIS — K21.9: ICD-10-CM

## 2023-11-20 DIAGNOSIS — I12.9: ICD-10-CM

## 2023-11-20 DIAGNOSIS — I70.223: ICD-10-CM

## 2023-11-20 DIAGNOSIS — N18.30: ICD-10-CM

## 2023-11-20 DIAGNOSIS — I25.10: ICD-10-CM

## 2023-11-20 DIAGNOSIS — I35.0: Primary | ICD-10-CM

## 2023-11-20 DIAGNOSIS — I65.23: ICD-10-CM

## 2023-11-20 DIAGNOSIS — Z95.5: ICD-10-CM

## 2023-11-20 DIAGNOSIS — E78.2: ICD-10-CM

## 2023-11-20 DIAGNOSIS — Z79.899: ICD-10-CM

## 2023-11-20 PROCEDURE — 93312 ECHO TRANSESOPHAGEAL: CPT
